# Patient Record
Sex: FEMALE | Race: BLACK OR AFRICAN AMERICAN | NOT HISPANIC OR LATINO | Employment: FULL TIME | URBAN - NONMETROPOLITAN AREA
[De-identification: names, ages, dates, MRNs, and addresses within clinical notes are randomized per-mention and may not be internally consistent; named-entity substitution may affect disease eponyms.]

---

## 2018-07-13 ENCOUNTER — HISTORICAL (OUTPATIENT)
Dept: ADMINISTRATIVE | Facility: HOSPITAL | Age: 43
End: 2018-07-13

## 2018-11-30 ENCOUNTER — HISTORICAL (OUTPATIENT)
Dept: LAB | Facility: HOSPITAL | Age: 43
End: 2018-11-30

## 2018-11-30 LAB
ABS NEUT (OLG): 2.6 X10(3)/MCL (ref 1.5–6.9)
ALBUMIN SERPL-MCNC: 3.4 GM/DL (ref 3.4–5)
ALBUMIN/GLOB SERPL: 0.8 RATIO
ALP SERPL-CCNC: 52 UNIT/L (ref 30–113)
ALT SERPL-CCNC: 14 UNIT/L (ref 10–45)
APPEARANCE, UA: CLEAR
AST SERPL-CCNC: 13 UNIT/L (ref 15–37)
BACTERIA SPEC CULT: ABNORMAL /HPF
BILIRUB SERPL-MCNC: 0.6 MG/DL (ref 0.1–0.9)
BILIRUB UR QL STRIP: NEGATIVE
BILIRUBIN DIRECT+TOT PNL SERPL-MCNC: 0.1 MG/DL (ref 0–0.3)
BILIRUBIN DIRECT+TOT PNL SERPL-MCNC: 0.5 MG/DL
BUN SERPL-MCNC: 15 MG/DL (ref 10–20)
CALCIUM SERPL-MCNC: 8.9 MG/DL (ref 8–10.5)
CHLORIDE SERPL-SCNC: 104 MMOL/L (ref 100–108)
CHOLEST SERPL-MCNC: 132 MG/DL (ref 140–200)
CHOLEST/HDLC SERPL: 2 MG/DL (ref 0–8)
CO2 SERPL-SCNC: 24 MMOL/L (ref 21–35)
COLOR UR: ABNORMAL
CREAT SERPL-MCNC: 0.67 MG/DL (ref 0.7–1.3)
ERYTHROCYTE [DISTWIDTH] IN BLOOD BY AUTOMATED COUNT: 15.5 % (ref 11.5–17)
GLOBULIN SER-MCNC: 4.4 GM/DL
GLUCOSE (UA): NEGATIVE
GLUCOSE SERPL-MCNC: 91 MG/DL (ref 75–116)
HCT VFR BLD AUTO: 32.7 % (ref 36–48)
HDLC SERPL-MCNC: 87 MG/DL (ref 35–59)
HGB BLD-MCNC: 10.1 GM/DL (ref 12–16)
HGB UR QL STRIP: ABNORMAL
KETONES UR QL STRIP: NEGATIVE
LDLC SERPL CALC-MCNC: 41 MG/DL (ref 100–129)
LEUKOCYTE ESTERASE UR QL STRIP: NEGATIVE
MCH RBC QN AUTO: 26 PG (ref 27–34)
MCHC RBC AUTO-ENTMCNC: 31 GM/DL (ref 31–36)
MCV RBC AUTO: 85 FL (ref 80–99)
MUCOUS THREADS URNS QL MICRO: ABNORMAL
NITRITE UR QL STRIP: NEGATIVE
PH UR STRIP: 6.5 [PH]
PLATELET # BLD AUTO: 265 X10(3)/MCL (ref 140–400)
PMV BLD AUTO: 11.2 FL (ref 6.8–10)
POTASSIUM SERPL-SCNC: 4.1 MMOL/L (ref 3.6–5.2)
PROT SERPL-MCNC: 7.8 GM/DL (ref 6.4–8.2)
PROT UR QL STRIP: NEGATIVE
RBC # BLD AUTO: 3.86 X10(6)/MCL (ref 4.2–5.4)
RBC #/AREA URNS HPF: ABNORMAL /HPF
SODIUM SERPL-SCNC: 137 MMOL/L (ref 135–145)
SP GR UR STRIP: 1.02
SQUAMOUS EPITHELIAL, UA: ABNORMAL /LPF
TRIGL SERPL-MCNC: 48 MG/DL (ref 35–150)
TSH SERPL-ACNC: 1.12 MIU/ML (ref 0.36–3.74)
UROBILINOGEN UR STRIP-ACNC: 0.2 EU/DL
VLDLC SERPL CALC-MCNC: 10 MG/DL
WBC # SPEC AUTO: 4 X10(3)/MCL (ref 4.5–11.5)
WBC #/AREA URNS HPF: ABNORMAL /HPF

## 2019-07-12 ENCOUNTER — HISTORICAL (OUTPATIENT)
Dept: LAB | Facility: HOSPITAL | Age: 44
End: 2019-07-12

## 2019-07-12 LAB
ABS NEUT (OLG): 1.3 X10(3)/MCL (ref 1.5–6.9)
ALBUMIN SERPL-MCNC: 3.5 GM/DL (ref 3.4–5)
ALBUMIN/GLOB SERPL: 1 RATIO
ALP SERPL-CCNC: 66 UNIT/L (ref 30–113)
ALT SERPL-CCNC: 7 UNIT/L (ref 10–45)
APPEARANCE, UA: CLEAR
AST SERPL-CCNC: 11 UNIT/L (ref 15–37)
BACTERIA SPEC CULT: ABNORMAL /HPF
BASOPHILS NFR BLD MANUAL: 0 % (ref 0–1)
BILIRUB SERPL-MCNC: 0.9 MG/DL (ref 0.1–0.9)
BILIRUB UR QL STRIP: NEGATIVE
BILIRUBIN DIRECT+TOT PNL SERPL-MCNC: 0.2 MG/DL (ref 0–0.3)
BILIRUBIN DIRECT+TOT PNL SERPL-MCNC: 0.7 MG/DL
BUN SERPL-MCNC: 16 MG/DL (ref 10–20)
CALCIUM SERPL-MCNC: 8.4 MG/DL (ref 8–10.5)
CHLORIDE SERPL-SCNC: 108 MMOL/L (ref 100–108)
CHOLEST SERPL-MCNC: 124 MG/DL (ref 140–200)
CHOLEST/HDLC SERPL: 2 MG/DL (ref 0–8)
CO2 SERPL-SCNC: 27 MMOL/L (ref 21–35)
COLOR UR: YELLOW
CREAT SERPL-MCNC: 0.79 MG/DL (ref 0.7–1.3)
DEPRECATED CALCIDIOL+CALCIFEROL SERPL-MC: 11.41 NG/ML (ref 30–80)
EOSINOPHIL NFR BLD MANUAL: 8 % (ref 0–5)
ERYTHROCYTE [DISTWIDTH] IN BLOOD BY AUTOMATED COUNT: 16 % (ref 11.5–17)
FERRITIN SERPL-MCNC: 9 NG/ML (ref 3–252)
FOLATE SERPL-MCNC: 15.5 NG/ML (ref 8.6–58.9)
GLOBULIN SER-MCNC: 3.5 GM/DL
GLUCOSE (UA): NEGATIVE
GLUCOSE SERPL-MCNC: 85 MG/DL (ref 75–116)
GRANULOCYTES NFR BLD MANUAL: 39 % (ref 47–80)
HCT VFR BLD AUTO: 28.2 % (ref 36–48)
HDLC SERPL-MCNC: 73 MG/DL (ref 35–59)
HGB BLD-MCNC: 8.8 GM/DL (ref 12–16)
HGB UR QL STRIP: ABNORMAL
IRON SATN MFR SERPL: 15 % (ref 15–55)
IRON SERPL-MCNC: 51 MCG/DL (ref 50–170)
KETONES UR QL STRIP: ABNORMAL
LDLC SERPL CALC-MCNC: 49 MG/DL (ref 100–129)
LEUKOCYTE ESTERASE UR QL STRIP: NEGATIVE
LYMPHOCYTES NFR BLD MANUAL: 45 % (ref 15–40)
MCH RBC QN AUTO: 26 PG (ref 27–34)
MCHC RBC AUTO-ENTMCNC: 31 GM/DL (ref 31–36)
MCV RBC AUTO: 85 FL (ref 80–99)
MONOCYTES NFR BLD MANUAL: 8 % (ref 4–12)
NITRITE UR QL STRIP: NEGATIVE
PH UR STRIP: 5.5 [PH]
PLATELET # BLD AUTO: 221 X10(3)/MCL (ref 140–400)
PLATELET # BLD EST: ADEQUATE 10*3/UL
PMV BLD AUTO: 10.8 FL (ref 6.8–10)
POTASSIUM SERPL-SCNC: 3.9 MMOL/L (ref 3.6–5.2)
PROT SERPL-MCNC: 7 GM/DL (ref 6.4–8.2)
PROT UR QL STRIP: NEGATIVE
RBC # BLD AUTO: 3.33 X10(6)/MCL (ref 4.2–5.4)
RBC #/AREA URNS HPF: ABNORMAL /HPF
RBC MORPH BLD: NORMAL
RET# (OHS): 0.03 X10(6)/MCL (ref 0.02–0.08)
RETICULOCYTE COUNT AUTOMATED (OLG): 0.9 % (ref 0.5–1.5)
SODIUM SERPL-SCNC: 141 MMOL/L (ref 135–145)
SP GR UR STRIP: >=1.03
SQUAMOUS EPITHELIAL, UA: ABNORMAL /LPF
T3FREE SERPL-MCNC: 2.79 PG/ML (ref 2.18–3.98)
T4 SERPL-MCNC: 6.3 MCG/DL (ref 5.3–11.5)
TESTOST SERPL-MCNC: 14 NG/DL (ref 14–76)
TIBC SERPL-MCNC: 287 MCG/DL (ref 150–375)
TIBC SERPL-MCNC: 338 MCG/DL (ref 250–450)
TRANSFERRIN SERPL-MCNC: 267 MG/DL (ref 200–360)
TRIGL SERPL-MCNC: 33 MG/DL (ref 35–150)
TSH SERPL-ACNC: 1.55 MIU/ML (ref 0.36–3.74)
UROBILINOGEN UR STRIP-ACNC: 0.2 EU/DL
VIT B12 SERPL-MCNC: 433 PG/ML (ref 193–986)
VLDLC SERPL CALC-MCNC: 7 MG/DL
WBC # SPEC AUTO: 3.2 X10(3)/MCL (ref 4.5–11.5)
WBC #/AREA URNS HPF: ABNORMAL /HPF

## 2019-07-23 ENCOUNTER — HISTORICAL (OUTPATIENT)
Dept: RADIOLOGY | Facility: HOSPITAL | Age: 44
End: 2019-07-23

## 2019-08-19 ENCOUNTER — HISTORICAL (OUTPATIENT)
Dept: INFUSION THERAPY | Facility: HOSPITAL | Age: 44
End: 2019-08-19

## 2019-08-26 ENCOUNTER — HISTORICAL (OUTPATIENT)
Dept: INFUSION THERAPY | Facility: HOSPITAL | Age: 44
End: 2019-08-26

## 2019-09-24 ENCOUNTER — HISTORICAL (OUTPATIENT)
Dept: LAB | Facility: HOSPITAL | Age: 44
End: 2019-09-24

## 2019-09-24 LAB
ABS NEUT (OLG): 1.6 X10(3)/MCL (ref 1.5–6.9)
APPEARANCE, UA: ABNORMAL
BACTERIA SPEC CULT: ABNORMAL /HPF
BILIRUB UR QL STRIP: NEGATIVE
COLOR UR: ABNORMAL
ERYTHROCYTE [DISTWIDTH] IN BLOOD BY AUTOMATED COUNT: 17.3 % (ref 11.5–17)
FERRITIN SERPL-MCNC: 370 NG/ML (ref 3–252)
FOLATE SERPL-MCNC: 16.5 NG/ML (ref 8.6–58.9)
GLUCOSE (UA): NEGATIVE
HCT VFR BLD AUTO: 37.2 % (ref 36–48)
HGB BLD-MCNC: 11.6 GM/DL (ref 12–16)
HGB UR QL STRIP: NEGATIVE
IRON SATN MFR SERPL: 26 % (ref 15–55)
IRON SERPL-MCNC: 66 MCG/DL (ref 50–170)
KETONES UR QL STRIP: NEGATIVE
LEUKOCYTE ESTERASE UR QL STRIP: ABNORMAL
MCH RBC QN AUTO: 28 PG (ref 27–34)
MCHC RBC AUTO-ENTMCNC: 31 GM/DL (ref 31–36)
MCV RBC AUTO: 90 FL (ref 80–99)
NITRITE UR QL STRIP: NEGATIVE
PH UR STRIP: 6 [PH]
PLATELET # BLD AUTO: 212 X10(3)/MCL (ref 140–400)
PMV BLD AUTO: 10.2 FL (ref 6.8–10)
PROT UR QL STRIP: NEGATIVE
RBC # BLD AUTO: 4.15 X10(6)/MCL (ref 4.2–5.4)
RBC #/AREA URNS HPF: ABNORMAL /[HPF]
RET# (OHS): 0.07 X10(6)/MCL (ref 0.02–0.08)
RETICULOCYTE COUNT AUTOMATED (OLG): 1.65 % (ref 0.5–1.5)
SP GR UR STRIP: >=1.03
SQUAMOUS EPITHELIAL, UA: ABNORMAL /LPF
TIBC SERPL-MCNC: 189 MCG/DL (ref 150–375)
TIBC SERPL-MCNC: 255 MCG/DL (ref 250–450)
TRANSFERRIN SERPL-MCNC: 199 MG/DL (ref 200–360)
UROBILINOGEN UR STRIP-ACNC: 0.2 EU/DL
VIT B12 SERPL-MCNC: 1388 PG/ML (ref 193–986)
WBC # SPEC AUTO: 3.3 X10(3)/MCL (ref 4.5–11.5)
WBC #/AREA URNS HPF: ABNORMAL /HPF

## 2019-09-25 ENCOUNTER — HISTORICAL (OUTPATIENT)
Dept: SURGERY | Facility: HOSPITAL | Age: 44
End: 2019-09-25

## 2019-09-26 LAB — FINAL CULTURE: NO GROWTH

## 2019-09-27 ENCOUNTER — HISTORICAL (OUTPATIENT)
Dept: LAB | Facility: HOSPITAL | Age: 44
End: 2019-09-27

## 2019-11-19 ENCOUNTER — HISTORICAL (OUTPATIENT)
Dept: ADMINISTRATIVE | Facility: HOSPITAL | Age: 44
End: 2019-11-19

## 2020-04-16 ENCOUNTER — HISTORICAL (OUTPATIENT)
Dept: RADIOLOGY | Facility: HOSPITAL | Age: 45
End: 2020-04-16

## 2020-04-16 LAB — POC CREATININE: 0.8 MG/DL (ref 0.6–1.3)

## 2020-05-08 ENCOUNTER — HISTORICAL (OUTPATIENT)
Dept: ADMINISTRATIVE | Facility: HOSPITAL | Age: 45
End: 2020-05-08

## 2020-07-10 ENCOUNTER — HISTORICAL (OUTPATIENT)
Dept: ADMINISTRATIVE | Facility: HOSPITAL | Age: 45
End: 2020-07-10

## 2020-07-24 ENCOUNTER — HISTORICAL (OUTPATIENT)
Dept: INFUSION THERAPY | Facility: HOSPITAL | Age: 45
End: 2020-07-24

## 2020-07-31 ENCOUNTER — HISTORICAL (OUTPATIENT)
Dept: INFUSION THERAPY | Facility: HOSPITAL | Age: 45
End: 2020-07-31

## 2020-09-04 ENCOUNTER — HISTORICAL (OUTPATIENT)
Dept: ADMINISTRATIVE | Facility: HOSPITAL | Age: 45
End: 2020-09-04

## 2020-11-18 ENCOUNTER — HISTORICAL (OUTPATIENT)
Dept: RADIOLOGY | Facility: HOSPITAL | Age: 45
End: 2020-11-18

## 2021-01-07 ENCOUNTER — HISTORICAL (OUTPATIENT)
Dept: ADMINISTRATIVE | Facility: HOSPITAL | Age: 46
End: 2021-01-07

## 2021-02-23 ENCOUNTER — HISTORICAL (OUTPATIENT)
Dept: RADIOLOGY | Facility: HOSPITAL | Age: 46
End: 2021-02-23

## 2021-05-06 ENCOUNTER — HISTORICAL (OUTPATIENT)
Dept: RADIOLOGY | Facility: HOSPITAL | Age: 46
End: 2021-05-06

## 2021-05-14 ENCOUNTER — HISTORICAL (OUTPATIENT)
Dept: RADIOLOGY | Facility: HOSPITAL | Age: 46
End: 2021-05-14

## 2021-07-06 ENCOUNTER — HISTORICAL (OUTPATIENT)
Dept: LAB | Facility: HOSPITAL | Age: 46
End: 2021-07-06

## 2021-07-06 LAB
ABS NEUT (OLG): 2 X10(3)/MCL (ref 1.5–6.9)
ALBUMIN SERPL-MCNC: 3.8 GM/DL (ref 3.5–5)
ALBUMIN/GLOB SERPL: 1.1 RATIO (ref 1.1–2)
ALP SERPL-CCNC: 84 UNIT/L (ref 40–150)
ALT SERPL-CCNC: 5 UNIT/L (ref 0–55)
APPEARANCE, UA: NORMAL
AST SERPL-CCNC: 12 UNIT/L (ref 5–34)
BACTERIA SPEC CULT: NORMAL /HPF
BASOPHILS # BLD AUTO: 0 X10(3)/MCL (ref 0–0.1)
BASOPHILS NFR BLD AUTO: 0 % (ref 0–1)
BILIRUB SERPL-MCNC: 0.8 MG/DL
BILIRUB UR QL STRIP: NEGATIVE
BILIRUBIN DIRECT+TOT PNL SERPL-MCNC: 0.3 MG/DL (ref 0–0.5)
BILIRUBIN DIRECT+TOT PNL SERPL-MCNC: 0.5 MG/DL (ref 0–0.8)
BUN SERPL-MCNC: 15 MG/DL (ref 7–18.7)
CALCIUM SERPL-MCNC: 9.5 MG/DL (ref 8.4–10.2)
CHLORIDE SERPL-SCNC: 107 MMOL/L (ref 98–107)
CHOLEST SERPL-MCNC: 126 MG/DL
CHOLEST/HDLC SERPL: 2 {RATIO} (ref 0–5)
CO2 SERPL-SCNC: 26 MMOL/L (ref 22–29)
COLOR UR: YELLOW
CREAT SERPL-MCNC: 0.75 MG/DL (ref 0.55–1.02)
DEPRECATED CALCIDIOL+CALCIFEROL SERPL-MC: 30.9 NG/ML (ref 30–80)
EOSINOPHIL # BLD AUTO: 0.1 X10(3)/MCL (ref 0–0.6)
EOSINOPHIL NFR BLD AUTO: 2 % (ref 0–5)
ERYTHROCYTE [DISTWIDTH] IN BLOOD BY AUTOMATED COUNT: 15 % (ref 11.5–17)
FERRITIN SERPL-MCNC: 39.74 NG/ML (ref 4.63–204)
FOLATE SERPL-MCNC: 13.1 NG/ML (ref 7–31.4)
GLOBULIN SER-MCNC: 3.4 GM/DL (ref 2.4–3.5)
GLUCOSE (UA): NEGATIVE MG/DL
GLUCOSE SERPL-MCNC: 90 MG/DL (ref 74–100)
HCT VFR BLD AUTO: 34.2 % (ref 36–48)
HDLC SERPL-MCNC: 53 MG/DL (ref 35–60)
HGB BLD-MCNC: 11 GM/DL (ref 12–16)
HGB UR QL STRIP: NORMAL
IMM GRANULOCYTES # BLD AUTO: 0.01 10*3/UL (ref 0–0.02)
IMM GRANULOCYTES NFR BLD AUTO: 0.3 % (ref 0–0.43)
IRON SATN MFR SERPL: 20 % (ref 20–50)
IRON SERPL-MCNC: 50 UG/DL (ref 50–170)
KETONES UR QL STRIP: NEGATIVE MG/DL
LDLC SERPL CALC-MCNC: 59 MG/DL (ref 50–140)
LEUKOCYTE ESTERASE UR QL STRIP: NEGATIVE
LYMPHOCYTES # BLD AUTO: 1.3 X10(3)/MCL (ref 0.5–4.1)
LYMPHOCYTES NFR BLD AUTO: 34 % (ref 15–40)
MCH RBC QN AUTO: 28 PG (ref 27–34)
MCHC RBC AUTO-ENTMCNC: 32 GM/DL (ref 31–36)
MCV RBC AUTO: 86 FL (ref 80–99)
MONOCYTES # BLD AUTO: 0.3 X10(3)/MCL (ref 0–1.1)
MONOCYTES NFR BLD AUTO: 9 % (ref 4–12)
NEUTROPHILS # BLD AUTO: 2 X10(3)/MCL (ref 1.5–6.9)
NEUTROPHILS NFR BLD AUTO: 55 % (ref 43–75)
NITRITE UR QL STRIP: NEGATIVE
PH UR STRIP: 5.5 [PH] (ref 4.6–8)
PLATELET # BLD AUTO: 267 X10(3)/MCL (ref 140–400)
PMV BLD AUTO: 10.5 FL (ref 6.8–10)
POTASSIUM SERPL-SCNC: 4.1 MMOL/L (ref 3.5–5.1)
PROT SERPL-MCNC: 7.2 GM/DL (ref 6.4–8.3)
PROT UR QL STRIP: NEGATIVE MG/DL
RBC # BLD AUTO: 3.97 X10(6)/MCL (ref 4.2–5.4)
RBC #/AREA URNS HPF: NORMAL /HPF
RET# (OHS): 0.07 X10(6)/MCL (ref 0.02–0.08)
RETICULOCYTE COUNT AUTOMATED (OLG): 1.69 % (ref 0.5–1.5)
SODIUM SERPL-SCNC: 138 MMOL/L (ref 136–145)
SP GR UR STRIP: >=1.03 (ref 1–1.03)
SQUAMOUS EPITHELIAL, UA: NORMAL /LPF
TIBC SERPL-MCNC: 200 UG/DL (ref 70–310)
TIBC SERPL-MCNC: 250 UG/DL (ref 250–450)
TRANSFERRIN SERPL-MCNC: 218 MG/DL (ref 180–382)
TRIGL SERPL-MCNC: 72 MG/DL (ref 37–140)
UROBILINOGEN UR STRIP-ACNC: 0.2 EU/DL
VIT B12 SERPL-MCNC: 554 PG/ML (ref 213–816)
VLDLC SERPL CALC-MCNC: 14 MG/DL
WBC # SPEC AUTO: 3.8 X10(3)/MCL (ref 4.5–11.5)
WBC #/AREA URNS HPF: NORMAL /HPF

## 2021-11-18 LAB — POC BETA-HCG (QUAL): NEGATIVE

## 2021-11-29 ENCOUNTER — HISTORICAL (OUTPATIENT)
Dept: ADMINISTRATIVE | Facility: HOSPITAL | Age: 46
End: 2021-11-29

## 2021-11-29 LAB
ERYTHROCYTE [DISTWIDTH] IN BLOOD BY AUTOMATED COUNT: 14.9 % (ref 11–14.5)
HCT VFR BLD AUTO: 32.6 % (ref 36–48)
HGB BLD-MCNC: 10.4 G/DL (ref 11.8–16)
MCH RBC QN AUTO: 26.9 PG (ref 27–34)
MCHC RBC AUTO-ENTMCNC: 31.9 G/DL (ref 31–37)
MCV RBC AUTO: 84.5 FL (ref 79–99)
PLATELET # BLD AUTO: 274 10*3/UL (ref 140–371)
PMV BLD AUTO: 11.2 FL (ref 9.4–12.4)
RBC # BLD AUTO: 3.86 10*6/UL (ref 4–5.1)
T3FREE SERPL-MCNC: 2.5 PG/ML (ref 2.7–5.2)
T4 FREE SERPL-MCNC: 0.93 NG/DL (ref 0.78–2.19)
TSH SERPL-ACNC: 1.7 UIU/ML (ref 0.36–3.74)
WBC # SPEC AUTO: 4.8 10*3/UL (ref 4–11.5)

## 2021-12-08 LAB — POC BETA-HCG (QUAL): NEGATIVE

## 2021-12-15 LAB — POC BETA-HCG (QUAL): NEGATIVE

## 2022-02-10 LAB
HUMAN PAPILLOMAVIRUS (HPV): NORMAL
PAP RECOMMENDATION EXT: NORMAL

## 2022-02-23 ENCOUNTER — HISTORICAL (OUTPATIENT)
Dept: ADMINISTRATIVE | Facility: HOSPITAL | Age: 47
End: 2022-02-23

## 2022-02-23 LAB — BCS RECOMMENDATION EXT: NORMAL

## 2022-04-11 ENCOUNTER — HISTORICAL (OUTPATIENT)
Dept: ADMINISTRATIVE | Facility: HOSPITAL | Age: 47
End: 2022-04-11
Payer: MEDICAID

## 2022-04-19 ENCOUNTER — HISTORICAL (OUTPATIENT)
Dept: LAB | Facility: HOSPITAL | Age: 47
End: 2022-04-19
Payer: MEDICAID

## 2022-04-19 LAB
ABS NEUT (OLG): 1.7 (ref 1.5–6.9)
ALBUMIN SERPL-MCNC: 3.2 G/DL (ref 3.5–5)
ALBUMIN/GLOB SERPL: 0.9 {RATIO} (ref 1.1–2)
ALP SERPL-CCNC: 60 U/L (ref 40–150)
ALT SERPL-CCNC: 6 U/L (ref 0–55)
APPEARANCE, UA: CLEAR
AST SERPL-CCNC: 12 U/L (ref 5–34)
BACTERIA SPEC CULT: NORMAL
BASOPHILS NFR BLD MANUAL: 0 % (ref 0–1)
BILIRUB SERPL-MCNC: 0.7 MG/DL
BILIRUB UR QL STRIP: NEGATIVE
BILIRUBIN DIRECT+TOT PNL SERPL-MCNC: 0.3 (ref 0–0.5)
BILIRUBIN DIRECT+TOT PNL SERPL-MCNC: 0.4 (ref 0–0.8)
BUN SERPL-MCNC: 14 MG/DL (ref 7–18.7)
CALCIUM SERPL-MCNC: 8.9 MG/DL (ref 8.7–10.5)
CHLORIDE SERPL-SCNC: 110 MMOL/L (ref 98–107)
CHOLEST SERPL-MCNC: 143 MG/DL
CHOLEST/HDLC SERPL: 2 {RATIO} (ref 0–5)
CO2 SERPL-SCNC: 22 MMOL/L (ref 22–29)
COLOR UR: YELLOW
CREAT SERPL-MCNC: 0.82 MG/DL (ref 0.55–1.02)
DEPRECATED CALCIDIOL+CALCIFEROL SERPL-MC: 33.4 NG/ML (ref 30–80)
DO MICRO?: YES
EOSINOPHIL NFR BLD MANUAL: 5 % (ref 0–5)
ERYTHROCYTE [DISTWIDTH] IN BLOOD BY AUTOMATED COUNT: 14.5 % (ref 11.5–17)
ESTRADIOL SERPL HS-MCNC: <24 PG/ML
FERRITIN SERPL-MCNC: 10.7 NG/ML (ref 4.63–204)
FOLATE SERPL-MCNC: 10.5 NG/ML (ref 7–31.4)
FSH SERPL-ACNC: 0.28 M[IU]/ML
GLOBULIN SER-MCNC: 3.4 G/DL (ref 2.4–3.5)
GLUCOSE (UA): NEGATIVE
GLUCOSE SERPL-MCNC: 92 MG/DL (ref 74–100)
GRANULOCYTES NFR BLD MANUAL: 47 % (ref 47–80)
HCT VFR BLD AUTO: 27.7 % (ref 36–48)
HDLC SERPL-MCNC: 66 MG/DL (ref 35–60)
HEMOLYSIS INTERF INDEX SERPL-ACNC: <0
HGB BLD-MCNC: 8.4 G/DL (ref 12–16)
HGB UR QL STRIP: NORMAL
HYPOCHROMIA BLD QL SMEAR: NORMAL
ICTERIC INTERF INDEX SERPL-ACNC: 1
KETONES UR QL STRIP: NEGATIVE
LDLC SERPL CALC-MCNC: 53 MG/DL (ref 50–140)
LEUKOCYTE ESTERASE UR QL STRIP: NEGATIVE
LIPEMIC INTERF INDEX SERPL-ACNC: 0
LYMPHOCYTES NFR BLD MANUAL: 43 % (ref 15–40)
MANUAL DIFF? (OHS): YES
MCH RBC QN AUTO: 26 PG (ref 27–34)
MCHC RBC AUTO-ENTMCNC: 30 G/DL (ref 31–36)
MCV RBC AUTO: 85 FL (ref 80–99)
MONOCYTES NFR BLD MANUAL: 5 % (ref 4–12)
MUCOUS THREADS URNS QL MICRO: NORMAL
NITRITE UR QL STRIP: NEGATIVE
PH UR STRIP: 5.5 [PH] (ref 4.6–8)
PLATELET # BLD AUTO: 294 10*3/UL (ref 140–400)
PLATELET # BLD EST: ADEQUATE 10*3/UL
PMV BLD AUTO: 10.9 FL (ref 6.8–10)
POTASSIUM SERPL-SCNC: 3.7 MMOL/L (ref 3.5–5.1)
PROT SERPL-MCNC: 6.6 G/DL (ref 6.4–8.3)
PROT UR QL STRIP: 30
RBC # BLD AUTO: 3.26 10*6/UL (ref 4.2–5.4)
RBC #/AREA URNS HPF: NORMAL /[HPF] (ref 0–2)
RBC MORPH BLD: NORMAL
RET# (OHS): 0.05 (ref 0.02–0.08)
RETICULOCYTE COUNT AUTOMATED (OLG): 1.58 (ref 0.5–1.5)
SODIUM SERPL-SCNC: 139 MMOL/L (ref 136–145)
SP GR UR STRIP: >=1.03 (ref 1–1.03)
SQUAMOUS EPITHELIAL, UA: NORMAL
T3FREE SERPL-MCNC: 2.93 PG/ML (ref 1.58–3.91)
T4 SERPL-MCNC: 10.64 UG/DL (ref 4.87–11.72)
TESTOST SERPL-MCNC: 29.64 NG/DL (ref 13.84–53.35)
TRANSFERRIN SERPL-MCNC: 318 MG/DL (ref 180–382)
TRIGL SERPL-MCNC: 118 MG/DL (ref 37–140)
TSH SERPL-ACNC: 1.96 M[IU]/L (ref 0.35–4.94)
URATE SERPL-MCNC: 3.9 MG/DL (ref 2.6–6)
UROBILINOGEN UR STRIP-ACNC: 0.2
VIT B12 SERPL-MCNC: 326 PG/ML (ref 213–816)
VLDLC SERPL CALC-MCNC: 24 MG/DL
WBC # SPEC AUTO: 3.4 10*3/UL (ref 4.5–11.5)
WBC #/AREA URNS HPF: NORMAL /[HPF] (ref 0–2)

## 2022-04-28 VITALS
SYSTOLIC BLOOD PRESSURE: 128 MMHG | BODY MASS INDEX: 34.04 KG/M2 | HEIGHT: 68 IN | WEIGHT: 224.63 LBS | DIASTOLIC BLOOD PRESSURE: 82 MMHG

## 2022-05-07 ENCOUNTER — HISTORICAL (OUTPATIENT)
Dept: ADMINISTRATIVE | Facility: HOSPITAL | Age: 47
End: 2022-05-07
Payer: MEDICAID

## 2022-05-09 DIAGNOSIS — D50.9 IRON DEFICIENCY ANEMIA, UNSPECIFIED IRON DEFICIENCY ANEMIA TYPE: ICD-10-CM

## 2022-05-09 RX ORDER — HEPARIN 100 UNIT/ML
5 SYRINGE INTRAVENOUS
Status: CANCELLED | OUTPATIENT
Start: 2022-05-10

## 2022-05-09 RX ORDER — METHYLPREDNISOLONE SOD SUCC 125 MG
40 VIAL (EA) INJECTION
Status: CANCELLED
Start: 2022-05-10

## 2022-05-09 RX ORDER — ACETAMINOPHEN 325 MG/1
650 TABLET ORAL
Status: CANCELLED
Start: 2022-05-10

## 2022-05-09 RX ORDER — SODIUM CHLORIDE 0.9 % (FLUSH) 0.9 %
10 SYRINGE (ML) INJECTION
Status: CANCELLED | OUTPATIENT
Start: 2022-05-10

## 2022-05-09 RX ORDER — DIPHENHYDRAMINE HCL 25 MG
25 CAPSULE ORAL
Status: CANCELLED
Start: 2022-05-10

## 2022-05-10 ENCOUNTER — INFUSION (OUTPATIENT)
Dept: INFUSION THERAPY | Facility: HOSPITAL | Age: 47
End: 2022-05-10
Attending: NURSE PRACTITIONER
Payer: MEDICAID

## 2022-05-10 VITALS
HEART RATE: 86 BPM | BODY MASS INDEX: 31.52 KG/M2 | WEIGHT: 208 LBS | DIASTOLIC BLOOD PRESSURE: 78 MMHG | SYSTOLIC BLOOD PRESSURE: 147 MMHG | HEIGHT: 68 IN | OXYGEN SATURATION: 99 % | RESPIRATION RATE: 18 BRPM | TEMPERATURE: 99 F

## 2022-05-10 DIAGNOSIS — D50.9 IRON DEFICIENCY ANEMIA, UNSPECIFIED IRON DEFICIENCY ANEMIA TYPE: Primary | ICD-10-CM

## 2022-05-10 PROCEDURE — 96365 THER/PROPH/DIAG IV INF INIT: CPT

## 2022-05-10 PROCEDURE — 25000003 PHARM REV CODE 250

## 2022-05-10 PROCEDURE — 63600175 PHARM REV CODE 636 W HCPCS

## 2022-05-10 RX ORDER — METHYLPREDNISOLONE SOD SUCC 125 MG
40 VIAL (EA) INJECTION
Status: CANCELLED
Start: 2022-05-17

## 2022-05-10 RX ORDER — ACETAMINOPHEN 325 MG/1
650 TABLET ORAL
Status: CANCELLED
Start: 2022-05-17

## 2022-05-10 RX ORDER — SODIUM CHLORIDE 0.9 % (FLUSH) 0.9 %
10 SYRINGE (ML) INJECTION
Status: CANCELLED | OUTPATIENT
Start: 2022-05-17

## 2022-05-10 RX ORDER — HEPARIN 100 UNIT/ML
5 SYRINGE INTRAVENOUS
Status: CANCELLED | OUTPATIENT
Start: 2022-05-17

## 2022-05-10 RX ORDER — ACETAMINOPHEN 325 MG/1
650 TABLET ORAL
Status: COMPLETED | OUTPATIENT
Start: 2022-05-10 | End: 2022-05-10

## 2022-05-10 RX ORDER — DIPHENHYDRAMINE HCL 25 MG
25 CAPSULE ORAL
Status: COMPLETED | OUTPATIENT
Start: 2022-05-10 | End: 2022-05-10

## 2022-05-10 RX ORDER — DIPHENHYDRAMINE HCL 25 MG
25 CAPSULE ORAL
Status: CANCELLED
Start: 2022-05-17

## 2022-05-10 RX ORDER — METHYLPREDNISOLONE SOD SUCC 125 MG
40 VIAL (EA) INJECTION
Status: COMPLETED | OUTPATIENT
Start: 2022-05-10 | End: 2022-05-10

## 2022-05-10 RX ADMIN — ACETAMINOPHEN 650 MG: 325 TABLET ORAL at 01:05

## 2022-05-10 RX ADMIN — DIPHENHYDRAMINE HYDROCHLORIDE 25 MG: 25 CAPSULE ORAL at 01:05

## 2022-05-10 RX ADMIN — METHYLPREDNISOLONE SODIUM SUCCINATE 40 MG: 125 INJECTION, POWDER, FOR SOLUTION INTRAMUSCULAR; INTRAVENOUS at 01:05

## 2022-05-10 RX ADMIN — FERRIC CARBOXYMALTOSE INJECTION 750 MG: 50 INJECTION, SOLUTION INTRAVENOUS at 02:05

## 2022-05-19 ENCOUNTER — INFUSION (OUTPATIENT)
Dept: INFUSION THERAPY | Facility: HOSPITAL | Age: 47
End: 2022-05-19
Attending: NURSE PRACTITIONER
Payer: MEDICAID

## 2022-05-19 VITALS
HEART RATE: 84 BPM | HEIGHT: 68 IN | DIASTOLIC BLOOD PRESSURE: 77 MMHG | BODY MASS INDEX: 30.89 KG/M2 | TEMPERATURE: 98 F | SYSTOLIC BLOOD PRESSURE: 149 MMHG | OXYGEN SATURATION: 100 % | RESPIRATION RATE: 18 BRPM | WEIGHT: 203.81 LBS

## 2022-05-19 DIAGNOSIS — D50.9 IRON DEFICIENCY ANEMIA, UNSPECIFIED IRON DEFICIENCY ANEMIA TYPE: Primary | ICD-10-CM

## 2022-05-19 PROCEDURE — 63600175 PHARM REV CODE 636 W HCPCS: Mod: JG

## 2022-05-19 PROCEDURE — 96365 THER/PROPH/DIAG IV INF INIT: CPT

## 2022-05-19 PROCEDURE — 25000003 PHARM REV CODE 250

## 2022-05-19 PROCEDURE — 96375 TX/PRO/DX INJ NEW DRUG ADDON: CPT

## 2022-05-19 RX ORDER — DIPHENHYDRAMINE HCL 25 MG
25 CAPSULE ORAL
Status: CANCELLED
Start: 2022-05-24

## 2022-05-19 RX ORDER — METHYLPREDNISOLONE SOD SUCC 125 MG
40 VIAL (EA) INJECTION
Status: COMPLETED | OUTPATIENT
Start: 2022-05-19 | End: 2022-05-19

## 2022-05-19 RX ORDER — ACETAMINOPHEN 325 MG/1
650 TABLET ORAL
Status: COMPLETED | OUTPATIENT
Start: 2022-05-19 | End: 2022-05-19

## 2022-05-19 RX ORDER — METHYLPREDNISOLONE SOD SUCC 125 MG
40 VIAL (EA) INJECTION
Status: CANCELLED
Start: 2022-05-24

## 2022-05-19 RX ORDER — ACETAMINOPHEN 325 MG/1
650 TABLET ORAL
Status: CANCELLED
Start: 2022-05-24

## 2022-05-19 RX ORDER — SODIUM CHLORIDE 0.9 % (FLUSH) 0.9 %
10 SYRINGE (ML) INJECTION
Status: CANCELLED | OUTPATIENT
Start: 2022-05-24

## 2022-05-19 RX ORDER — HEPARIN 100 UNIT/ML
5 SYRINGE INTRAVENOUS
Status: CANCELLED | OUTPATIENT
Start: 2022-05-24

## 2022-05-19 RX ORDER — DIPHENHYDRAMINE HCL 25 MG
25 CAPSULE ORAL
Status: COMPLETED | OUTPATIENT
Start: 2022-05-19 | End: 2022-05-19

## 2022-05-19 RX ADMIN — FERRIC CARBOXYMALTOSE INJECTION 750 MG: 50 INJECTION, SOLUTION INTRAVENOUS at 02:05

## 2022-05-19 RX ADMIN — DIPHENHYDRAMINE HYDROCHLORIDE 25 MG: 25 CAPSULE ORAL at 02:05

## 2022-05-19 RX ADMIN — METHYLPREDNISOLONE SODIUM SUCCINATE 40 MG: 125 INJECTION, POWDER, FOR SOLUTION INTRAMUSCULAR; INTRAVENOUS at 02:05

## 2022-05-19 RX ADMIN — ACETAMINOPHEN 650 MG: 325 TABLET ORAL at 02:05

## 2022-05-23 DIAGNOSIS — D36.10 BENIGN NEOPLASM OF NERVE SHEATH: Primary | ICD-10-CM

## 2022-05-24 DIAGNOSIS — D36.10 BENIGN NEOPLASM OF NERVE SHEATH: Primary | ICD-10-CM

## 2022-05-24 DIAGNOSIS — M62.58 MUSCLE WASTING AND ATROPHY, NOT ELSEWHERE CLASSIFIED, OTHER SITE: ICD-10-CM

## 2022-06-03 ENCOUNTER — APPOINTMENT (OUTPATIENT)
Dept: RADIOLOGY | Facility: HOSPITAL | Age: 47
End: 2022-06-03
Attending: NEUROLOGICAL SURGERY
Payer: COMMERCIAL

## 2022-06-03 DIAGNOSIS — M62.58 MUSCLE WASTING AND ATROPHY, NOT ELSEWHERE CLASSIFIED, OTHER SITE: ICD-10-CM

## 2022-06-03 DIAGNOSIS — D36.10 BENIGN NEOPLASM OF NERVE SHEATH: ICD-10-CM

## 2022-06-03 PROCEDURE — 25500020 PHARM REV CODE 255: Performed by: NEUROLOGICAL SURGERY

## 2022-06-03 PROCEDURE — 72156 MRI NECK SPINE W/O & W/DYE: CPT | Mod: TC

## 2022-06-03 PROCEDURE — A9577 INJ MULTIHANCE: HCPCS | Performed by: NEUROLOGICAL SURGERY

## 2022-06-03 RX ADMIN — GADOBENATE DIMEGLUMINE 20 ML: 529 INJECTION, SOLUTION INTRAVENOUS at 02:06

## 2022-06-06 ENCOUNTER — OFFICE VISIT (OUTPATIENT)
Dept: NEUROSURGERY | Facility: CLINIC | Age: 47
End: 2022-06-06
Payer: COMMERCIAL

## 2022-06-06 VITALS
WEIGHT: 204 LBS | HEIGHT: 67 IN | HEART RATE: 80 BPM | RESPIRATION RATE: 16 BRPM | DIASTOLIC BLOOD PRESSURE: 88 MMHG | BODY MASS INDEX: 32.02 KG/M2 | SYSTOLIC BLOOD PRESSURE: 130 MMHG

## 2022-06-06 DIAGNOSIS — D36.10 BENIGN NEOPLASM OF NERVE SHEATH: Primary | ICD-10-CM

## 2022-06-06 PROCEDURE — 1160F RVW MEDS BY RX/DR IN RCRD: CPT | Mod: CPTII,,, | Performed by: NEUROLOGICAL SURGERY

## 2022-06-06 PROCEDURE — 3079F DIAST BP 80-89 MM HG: CPT | Mod: CPTII,,, | Performed by: NEUROLOGICAL SURGERY

## 2022-06-06 PROCEDURE — 3075F PR MOST RECENT SYSTOLIC BLOOD PRESS GE 130-139MM HG: ICD-10-PCS | Mod: CPTII,,, | Performed by: NEUROLOGICAL SURGERY

## 2022-06-06 PROCEDURE — 3008F PR BODY MASS INDEX (BMI) DOCUMENTED: ICD-10-PCS | Mod: CPTII,,, | Performed by: NEUROLOGICAL SURGERY

## 2022-06-06 PROCEDURE — 1160F PR REVIEW ALL MEDS BY PRESCRIBER/CLIN PHARMACIST DOCUMENTED: ICD-10-PCS | Mod: CPTII,,, | Performed by: NEUROLOGICAL SURGERY

## 2022-06-06 PROCEDURE — 1159F PR MEDICATION LIST DOCUMENTED IN MEDICAL RECORD: ICD-10-PCS | Mod: CPTII,,, | Performed by: NEUROLOGICAL SURGERY

## 2022-06-06 PROCEDURE — 99213 PR OFFICE/OUTPT VISIT, EST, LEVL III, 20-29 MIN: ICD-10-PCS | Mod: ,,, | Performed by: NEUROLOGICAL SURGERY

## 2022-06-06 PROCEDURE — 99213 OFFICE O/P EST LOW 20 MIN: CPT | Mod: ,,, | Performed by: NEUROLOGICAL SURGERY

## 2022-06-06 PROCEDURE — 3079F PR MOST RECENT DIASTOLIC BLOOD PRESSURE 80-89 MM HG: ICD-10-PCS | Mod: CPTII,,, | Performed by: NEUROLOGICAL SURGERY

## 2022-06-06 PROCEDURE — 1159F MED LIST DOCD IN RCRD: CPT | Mod: CPTII,,, | Performed by: NEUROLOGICAL SURGERY

## 2022-06-06 PROCEDURE — 3075F SYST BP GE 130 - 139MM HG: CPT | Mod: CPTII,,, | Performed by: NEUROLOGICAL SURGERY

## 2022-06-06 PROCEDURE — 3008F BODY MASS INDEX DOCD: CPT | Mod: CPTII,,, | Performed by: NEUROLOGICAL SURGERY

## 2022-06-06 RX ORDER — CLINDAMYCIN HYDROCHLORIDE 300 MG/1
CAPSULE ORAL
COMMUNITY
Start: 2022-05-18 | End: 2023-05-11

## 2022-06-06 RX ORDER — AMOXICILLIN AND CLAVULANATE POTASSIUM 875; 125 MG/1; MG/1
1 TABLET, FILM COATED ORAL 2 TIMES DAILY
COMMUNITY
Start: 2022-04-18 | End: 2023-05-11

## 2022-06-06 RX ORDER — FLUTICASONE PROPIONATE 50 MCG
SPRAY, SUSPENSION (ML) NASAL
COMMUNITY
Start: 2022-03-28 | End: 2023-06-30

## 2022-06-06 RX ORDER — IBUPROFEN 800 MG/1
TABLET ORAL
COMMUNITY
Start: 2022-05-02 | End: 2023-05-11

## 2022-06-06 RX ORDER — CYANOCOBALAMIN 1000 UG/ML
INJECTION, SOLUTION INTRAMUSCULAR; SUBCUTANEOUS
COMMUNITY
Start: 2022-05-02 | End: 2023-12-07

## 2022-06-06 RX ORDER — ESZOPICLONE 3 MG/1
3 TABLET, FILM COATED ORAL NIGHTLY
COMMUNITY
Start: 2022-05-28 | End: 2023-06-30

## 2022-06-06 RX ORDER — VITAMIN E 268 MG
400 CAPSULE ORAL DAILY
COMMUNITY

## 2022-06-06 RX ORDER — UBIDECARENONE 30 MG
30 CAPSULE ORAL 3 TIMES DAILY
COMMUNITY

## 2022-06-06 RX ORDER — PREGABALIN 150 MG/1
150 CAPSULE ORAL 2 TIMES DAILY
COMMUNITY
Start: 2022-05-10 | End: 2023-06-30

## 2022-06-06 RX ORDER — KETOROLAC TROMETHAMINE 10 MG/1
10 TABLET, FILM COATED ORAL EVERY 6 HOURS PRN
COMMUNITY
Start: 2022-04-18 | End: 2023-05-11 | Stop reason: SDUPTHER

## 2022-06-06 RX ORDER — IBUPROFEN 100 MG/5ML
1000 SUSPENSION, ORAL (FINAL DOSE FORM) ORAL DAILY
COMMUNITY
End: 2023-05-11 | Stop reason: SDUPTHER

## 2022-06-06 RX ORDER — HYDROCODONE BITARTRATE AND ACETAMINOPHEN 7.5; 325 MG/1; MG/1
1 TABLET ORAL 2 TIMES DAILY PRN
COMMUNITY
Start: 2022-06-01 | End: 2023-05-11

## 2022-06-06 RX ORDER — CETIRIZINE HYDROCHLORIDE 10 MG/1
10 TABLET ORAL DAILY
COMMUNITY
Start: 2022-03-28 | End: 2023-06-30

## 2022-06-06 RX ORDER — ACETAMINOPHEN 500 MG
5000 TABLET ORAL DAILY
COMMUNITY
End: 2023-05-11 | Stop reason: SDUPTHER

## 2022-06-06 RX ORDER — DESOGESTREL AND ETHINYL ESTRADIOL 0.15-0.03
1 KIT ORAL DAILY
COMMUNITY
Start: 2022-05-28 | End: 2023-02-09

## 2022-06-06 NOTE — PROGRESS NOTES
[facility]  Neurosurgery  Established Patient    SUBJECTIVE:     History of Present Illness:  Patient is a 46 y.o. female presents for a 2 year post op appointment with a cervical MRI.  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  She continues with pain and tightness across the neck and upper trapezius region, but says this has improved since her last office visit.  She is getting trigger point injections and Botox injections with Dr. Pedro Culp, which have helped significantly.  She says the trigger point injections last about 5 weeks.  She had her first Botox injections on May 3.  Dr. Culp anticipates the Botox lasting about three months.  She says she is feeling much better overall and has lost a little over 20lbs since her last visit a year ago.    Patient Active Problem List    Diagnosis Date Noted    Iron deficiency anemia, unspecified 05/09/2022     Past Medical History:   Diagnosis Date    Anemia     Benign neoplasm of nerve sheath     Cervical pain     Lumbar radiculopathy     Muscle spasticity     Muscle wasting and atrophy, not elsewhere classified, other site     Thoracic back pain       Past Surgical History:   Procedure Laterality Date    C6-T1 LAMINECTOMIES W/ EXCISION OF RIGHT C8 SCHWANNOMA  05/01/2020    Dr. Orlando      (Not in a hospital admission)    Review of patient's allergies indicates:  No Known Allergies   Social History     Tobacco Use    Smoking status: Never Smoker    Smokeless tobacco: Never Used   Substance Use Topics    Alcohol use: Yes      Family History   Problem Relation Age of Onset    Heart disease Mother     Hyperlipidemia Mother     Kidney disease Mother     Vascular Malformations Mother     Diabetes Mother     Heart disease Father     Hyperlipidemia Father     Stroke Father        Review of Systems:    Pertinent items are noted in  "HPI.      OBJECTIVE:     Vital Signs  Pulse: 80  Resp: 16  BP: 130/88  Pain Score:   2  Height: 5' 7" (170.2 cm)  Weight: 92.5 kg (204 lb)  Body mass index is 31.95 kg/m².    General:  alert, no distress, cooperative    Head:  Normocephalic, without obvious abnormality, atraumatic    Lungs:   Breathing is quiet, non-lablored    Neurological:    Oriented to Person, Place, Time   Speech:  normal  Memory, cognition, and affect are appropriate.  Extraocular movements are intact.  Movements of facial expression are intact and symmetric.  no muscle wasting or atrophy and normal resting muscle tone  Sensation is intact.  Gait is normal        ASSESSMENT/PLAN:     1. Benign neoplasm of nerve sheath    Her most recent MRI shows no evidence of residual/recurrent tumor.  Overall, she is doing much better and in good spirits.  We will see her again in 1 year with another MRI.    Elsie Medrano RN acting as scribe for this encounter on 06/06/2022      I, Dr. Derick Orlando, personally performed the services described in this documentation. All medical record entries made by the scribe were at my direction and in my presence.  I have reviewed the chart and agree that the record reflects my personal performance and is accurate and complete. Derick Orlando MD.  2:49 PM 06/06/2022  "

## 2022-06-14 ENCOUNTER — LAB VISIT (OUTPATIENT)
Dept: LAB | Facility: HOSPITAL | Age: 47
End: 2022-06-14
Attending: NURSE PRACTITIONER
Payer: MEDICAID

## 2022-06-14 DIAGNOSIS — D72.818 BASOPHILOPENIA: ICD-10-CM

## 2022-06-14 DIAGNOSIS — F51.02 TRANSIENT DISORDER OF INITIATING OR MAINTAINING SLEEP: ICD-10-CM

## 2022-06-14 DIAGNOSIS — R53.83 FATIGUE, UNSPECIFIED TYPE: ICD-10-CM

## 2022-06-14 DIAGNOSIS — D50.9 IRON DEFICIENCY ANEMIA, UNSPECIFIED: ICD-10-CM

## 2022-06-14 DIAGNOSIS — N92.1 METRORRHAGIA: ICD-10-CM

## 2022-06-14 DIAGNOSIS — D64.9 ANEMIA, UNSPECIFIED TYPE: Primary | ICD-10-CM

## 2022-06-14 LAB
(HCYS)2 SERPL-MCNC: 8.5 UMOL/L (ref 5.1–15.4)
APTT PPP: 28.2 SECONDS (ref 23.2–33.7)
CRP SERPL-MCNC: 2.3 MG/L
ERYTHROCYTE [SEDIMENTATION RATE] IN BLOOD: 20 MM/HR (ref 0–20)
HCT VFR BLD AUTO: 33 % (ref 37–47)
HGB BLD-MCNC: 10 GM/DL (ref 12–16)
INR BLD: 1.02 (ref 0–1.3)
PROTHROMBIN TIME: 13.3 SECONDS (ref 12.5–14.5)

## 2022-06-14 PROCEDURE — 85730 THROMBOPLASTIN TIME PARTIAL: CPT

## 2022-06-14 PROCEDURE — 81241 F5 GENE: CPT

## 2022-06-14 PROCEDURE — 85306 CLOT INHIBIT PROT S FREE: CPT

## 2022-06-14 PROCEDURE — 86140 C-REACTIVE PROTEIN: CPT

## 2022-06-14 PROCEDURE — 85651 RBC SED RATE NONAUTOMATED: CPT

## 2022-06-14 PROCEDURE — 85610 PROTHROMBIN TIME: CPT

## 2022-06-14 PROCEDURE — 85014 HEMATOCRIT: CPT

## 2022-06-14 PROCEDURE — 83068 HEMOGLOBIN UNSTABLE SCREEN: CPT

## 2022-06-14 PROCEDURE — 85303 CLOT INHIBIT PROT C ACTIVITY: CPT

## 2022-06-14 PROCEDURE — 86147 CARDIOLIPIN ANTIBODY EA IG: CPT

## 2022-06-14 PROCEDURE — 36415 COLL VENOUS BLD VENIPUNCTURE: CPT

## 2022-06-14 PROCEDURE — 85302 CLOT INHIBIT PROT C ANTIGEN: CPT

## 2022-06-14 PROCEDURE — 83020 HEMOGLOBIN ELECTROPHORESIS: CPT

## 2022-06-14 PROCEDURE — 82664 ELECTROPHORETIC TEST: CPT

## 2022-06-14 PROCEDURE — 86039 ANTINUCLEAR ANTIBODIES (ANA): CPT

## 2022-06-14 PROCEDURE — 83090 ASSAY OF HOMOCYSTEINE: CPT

## 2022-06-15 LAB
ANA SER QL HEP2 SUBST: NORMAL
CARDIOLIPIN ANTIBODY IGA (OHS): NEGATIVE APL UNIT/ML
CARDIOLIPIN ANTIBODY IGG (OHS): NEGATIVE GPL UNIT/ML
CARDIOLIPIN ANTIBODY IGM (OHS): NEGATIVE MPL UNIT/ML

## 2022-06-16 LAB
APTT PPP: NORMAL SEC (ref 25–37)
HGB A MFR BLD ELPH: 97.7 % (ref 95.8–98)
HGB A2 MFR BLD: 2.3 % (ref 2–3.3)
HGB F MFR BLD: 0 % (ref 0–0.9)
HGB FRACT BLD ELPH-IMP: NORMAL
INR PPP: NORMAL (ref 0.9–1.1)
LA 3 SCREEN W REFLEX-IMP: NORMAL
M HPLC HB VARIANT, B: NORMAL
PROT C ACT/NOR PPP CHRO: NORMAL % (ref 70–150)
PROT S ACT/NOR PPP: NORMAL %
PROTHROMBIN TIME: NORMAL SEC (ref 9.4–12.5)
SCREEN DRVVT/NORMAL: NORMAL %

## 2022-06-17 LAB
COAGULATION SPECIALIST REVIEW: NORMAL
F5 P.R506Q BLD/T QL: NEGATIVE
PROVIDER SIGNING NAME: NORMAL

## 2022-06-20 LAB
APTT PPP: 29 SEC (ref 25–37)
INR PPP: 1 (ref 0.9–1.1)
LA 3 SCREEN W REFLEX-IMP: NORMAL
PROT S FREE AG ACT/NOR PPP IA: 56 % (ref 50–160)
PROTHROMBIN TIME: 10.5 SEC (ref 9.4–12.5)
SCREEN DRVVT/NORMAL: 0.98 RATIO

## 2022-06-22 LAB — Lab: >95 % (ref 63–153)

## 2022-09-21 ENCOUNTER — HISTORICAL (OUTPATIENT)
Dept: ADMINISTRATIVE | Facility: HOSPITAL | Age: 47
End: 2022-09-21
Payer: MEDICAID

## 2022-12-27 ENCOUNTER — DOCUMENTATION ONLY (OUTPATIENT)
Dept: ADMINISTRATIVE | Facility: HOSPITAL | Age: 47
End: 2022-12-27
Payer: COMMERCIAL

## 2023-01-12 ENCOUNTER — DOCUMENTATION ONLY (OUTPATIENT)
Dept: ADMINISTRATIVE | Facility: HOSPITAL | Age: 48
End: 2023-01-12
Payer: COMMERCIAL

## 2023-03-07 DIAGNOSIS — D36.10 BENIGN NERVE SHEATH NEOPLASM: Primary | ICD-10-CM

## 2023-05-11 ENCOUNTER — OFFICE VISIT (OUTPATIENT)
Dept: OBSTETRICS AND GYNECOLOGY | Facility: CLINIC | Age: 48
End: 2023-05-11
Payer: COMMERCIAL

## 2023-05-11 VITALS
WEIGHT: 197.56 LBS | DIASTOLIC BLOOD PRESSURE: 74 MMHG | HEIGHT: 68 IN | TEMPERATURE: 98 F | BODY MASS INDEX: 29.94 KG/M2 | SYSTOLIC BLOOD PRESSURE: 122 MMHG

## 2023-05-11 DIAGNOSIS — D25.9 UTERINE LEIOMYOMA, UNSPECIFIED LOCATION: ICD-10-CM

## 2023-05-11 DIAGNOSIS — N92.1 MENOMETRORRHAGIA: ICD-10-CM

## 2023-05-11 DIAGNOSIS — Z01.419 WELL WOMAN EXAM: ICD-10-CM

## 2023-05-11 DIAGNOSIS — Z12.4 SCREENING FOR MALIGNANT NEOPLASM OF THE CERVIX: Primary | ICD-10-CM

## 2023-05-11 DIAGNOSIS — Z12.31 ENCOUNTER FOR SCREENING MAMMOGRAM FOR MALIGNANT NEOPLASM OF BREAST: ICD-10-CM

## 2023-05-11 PROCEDURE — 3008F PR BODY MASS INDEX (BMI) DOCUMENTED: ICD-10-PCS | Mod: CPTII,,, | Performed by: OBSTETRICS & GYNECOLOGY

## 2023-05-11 PROCEDURE — 3078F PR MOST RECENT DIASTOLIC BLOOD PRESSURE < 80 MM HG: ICD-10-PCS | Mod: CPTII,,, | Performed by: OBSTETRICS & GYNECOLOGY

## 2023-05-11 PROCEDURE — 3074F PR MOST RECENT SYSTOLIC BLOOD PRESSURE < 130 MM HG: ICD-10-PCS | Mod: CPTII,,, | Performed by: OBSTETRICS & GYNECOLOGY

## 2023-05-11 PROCEDURE — 1160F PR REVIEW ALL MEDS BY PRESCRIBER/CLIN PHARMACIST DOCUMENTED: ICD-10-PCS | Mod: CPTII,,, | Performed by: OBSTETRICS & GYNECOLOGY

## 2023-05-11 PROCEDURE — 1159F PR MEDICATION LIST DOCUMENTED IN MEDICAL RECORD: ICD-10-PCS | Mod: CPTII,,, | Performed by: OBSTETRICS & GYNECOLOGY

## 2023-05-11 PROCEDURE — 3008F BODY MASS INDEX DOCD: CPT | Mod: CPTII,,, | Performed by: OBSTETRICS & GYNECOLOGY

## 2023-05-11 PROCEDURE — 3078F DIAST BP <80 MM HG: CPT | Mod: CPTII,,, | Performed by: OBSTETRICS & GYNECOLOGY

## 2023-05-11 PROCEDURE — 1159F MED LIST DOCD IN RCRD: CPT | Mod: CPTII,,, | Performed by: OBSTETRICS & GYNECOLOGY

## 2023-05-11 PROCEDURE — 3074F SYST BP LT 130 MM HG: CPT | Mod: CPTII,,, | Performed by: OBSTETRICS & GYNECOLOGY

## 2023-05-11 PROCEDURE — 99396 PR PREVENTIVE VISIT,EST,40-64: ICD-10-PCS | Mod: ,,, | Performed by: OBSTETRICS & GYNECOLOGY

## 2023-05-11 PROCEDURE — 99396 PREV VISIT EST AGE 40-64: CPT | Mod: ,,, | Performed by: OBSTETRICS & GYNECOLOGY

## 2023-05-11 PROCEDURE — 1160F RVW MEDS BY RX/DR IN RCRD: CPT | Mod: CPTII,,, | Performed by: OBSTETRICS & GYNECOLOGY

## 2023-05-11 RX ORDER — IBUPROFEN 800 MG/1
TABLET ORAL
Status: ON HOLD | COMMUNITY
Start: 2022-06-07 | End: 2024-02-07 | Stop reason: HOSPADM

## 2023-05-11 RX ORDER — IBUPROFEN 100 MG/5ML
1 SUSPENSION, ORAL (FINAL DOSE FORM) ORAL DAILY
COMMUNITY

## 2023-05-11 RX ORDER — CHOLECALCIFEROL (VITAMIN D3) 125 MCG
1 CAPSULE ORAL DAILY
COMMUNITY

## 2023-05-11 NOTE — PROGRESS NOTES
Chief Complaint:   Well Woman (Annual Gyn Exam.  LMP: 23. Taking Desogen~Needs Refills.  +Uterine Fibroids.  /)     History of Present Illness:  Efraín Reyes is a 47 y.o. year old No obstetric history on file. here for her annual exam. Currently using desogen for birth control. Patient has monthly cycles with normal flow lasting 5-6 days. Admits to spotting a couple weeks following cycle. Denies any irregular menstrual bleeding.      LMP: 23  Frequency: monthly,    Cycle Length: 7 days   Flow: normal  Intermenstrual Bleeding: Yes  Postcoital Bleeding: No  Dysmenorrhea: Yes  Sexually Active: Yes   Dyspareunia: No  Contraception: OCP, Desogen   H/o STI: TV  Last pap: 2/10/22 - Neg w/ Neg HPV  H/o Abnormal Pap: Yes , age 20's  Colposcopy: x 2   Gardasil: 0/3   MM22 - Benign  H/o abnl MMG: yes, 30's- dense breast      Review of Systems:  General/Constitutional: Chills denies. Fatigue/weakness denies. Fever denies. Night sweats denies. Hot flashes denies    Respiratory: Cough denies. Hemoptysis denies. SOB denies. Sputum production denies. Wheezing denies .   Cardiovascular: Chest pain denies . Dizziness denies. Palpitations denies. Swelling in hands/feet denies    Gastrointestinal: Abdominal pain denies. Blood in stool denies. Constipation denies. Diarrhea denies. Heartburn denies. Nausea denies. Vomiting denies    Genitourinary: Incontinence denies. Blood in urine denies. Frequent urination denies. Painful urination denies. Urinary urgency denies. Nocturia denies    Gynecologic: Irregular menses admits. Heavy bleeding admits. Painful menses denies. Vaginal discharge denies. Vaginal odor denies. Vaginal itching denies. Vaginal lesion denies. Pelvic pain denies. Decreased libido denies. Vulvar lesion denies. Prolapse of genital organs denies. Painful intercourse denies. Postcoital bleeding denies    Psychiatric: Depression denies. Anxiety denies     OB History   No obstetric history on file.      No  obstetric history on file.    Past Medical History:   Diagnosis Date    Anemia     Benign neoplasm of nerve sheath     Cervical pain     Lumbar radiculopathy     Muscle spasticity     Muscle wasting and atrophy, not elsewhere classified, other site     Thoracic back pain        Current Outpatient Medications:     ascorbic acid, vitamin C, (VITAMIN C) 1000 MG tablet, Take 1 tablet by mouth once daily., Disp: , Rfl:     cholecalciferol, vitamin D3, 125 mcg (5,000 unit) capsule, Take 1 capsule by mouth once daily., Disp: , Rfl:     co-enzyme Q-10 30 mg capsule, Take 30 mg by mouth 3 (three) times daily., Disp: , Rfl:     cyanocobalamin 1,000 mcg/mL injection, INJECT 1ML INTO THE MUSCLE ONCE WEEKLY FOR 12 WEEKS, Disp: , Rfl:     desogestreL-ethinyl estradioL (ISIBLOOM) 0.15-0.03 mg per tablet, TAKE 1 TABLET BY MOUTH DAILY, Disp: 84 tablet, Rfl: 0    ibuprofen (ADVIL,MOTRIN) 800 MG tablet, 1 tablet with food or milk as needed Orally every 8 hrs, Disp: , Rfl:     vitamin E 400 UNIT capsule, Take 400 Units by mouth once daily., Disp: , Rfl:     zinc acetate 50 mg (zinc) Cap, Take 1 capsule by mouth once daily., Disp: , Rfl:     cetirizine (ZYRTEC) 10 MG tablet, Take 10 mg by mouth once daily., Disp: , Rfl:     eszopiclone (LUNESTA) 3 mg Tab, Take 3 mg by mouth nightly., Disp: , Rfl:     fluticasone propionate (FLONASE) 50 mcg/actuation nasal spray, SHAKE LIQUID AND USE 2 SPRAYS IN EACH NOSTRIL DAILY, Disp: , Rfl:     pregabalin (LYRICA) 150 MG capsule, Take 150 mg by mouth 2 (two) times daily., Disp: , Rfl:     Review of patient's allergies indicates:  No Known Allergies    Past Surgical History:   Procedure Laterality Date    C6-T1 LAMINECTOMIES W/ EXCISION OF RIGHT C8 SCHWANNOMA  05/01/2020    Dr. Orlando     Family History   Problem Relation Age of Onset    Stroke Father     Breast cancer Neg Hx     Colon cancer Neg Hx     Ovarian cancer Neg Hx     Uterine cancer Neg Hx     Cervical cancer Neg Hx      Social History  "    Socioeconomic History    Marital status:    Tobacco Use    Smoking status: Never     Passive exposure: Never    Smokeless tobacco: Never   Substance and Sexual Activity    Alcohol use: Never    Drug use: Never    Sexual activity: Yes     Partners: Male     Birth control/protection: OCP     Comment: STI: Trich       Physical Exam:  /74 (BP Location: Right arm, Patient Position: Sitting, BP Method: Medium (Manual))   Temp 97.9 °F (36.6 °C) (Temporal)   Ht 5' 8" (1.727 m)   Wt 89.6 kg (197 lb 8.5 oz)   LMP 05/08/2023 (Exact Date)   BMI 30.03 kg/m²     Chaperone: present.       General appearance: healthy, well-nourished and well-developed     Psychiatric: Orientation to time, place and person. Normal mood and affect and active, alert     Skin: Appearance: no rashes or lesions.     Neck:   Neck: supple, FROM, trachea midline. and no masses   Thyroid: no enlargement or nodules and non-tender.       Cardiovascular:   Auscultation: RRR and no murmur.   Peripheral Vascular: no varicosities, LLE edema, RLE edema, calf tenderness, and palpable cord and pedal pulses intact.     Lungs:   Respiratory effort: no intercostal retractions or accessory muscle usage.   Auscultation: no wheezing, rales/crackles, or rhonchi and clear to auscultation.     Breast:   Inspection/Palpation: no tenderness, discrete/distinct masses, skin changes, or abnormal secretions. Nipple appearance normal.     Abdomen:   Auscultation/Inspection/Palpation: no hepatomegaly, splenomegaly, masses, tenderness or CVA tenderness and soft, non-distended bowel sounds preset.    Hernia: no palpable hernias.     Female Genitalia:    Vulva: no masses, tenderness or lesions    Bladder/Urethra: no urethral discharge or mass, normal meatus, bladder non-distended.    Vagina: no tenderness, erythema, cystocele, rectocele, abnormal vaginal discharge or vesicle(s) or ulcers    Cervix: no discharge, no cervical lacerations noted or motion tenderness " and grossly normal    Uterus: diffusely enlarged non-tender, and no uterine prolapse.    Adnexa/Parametria: no parametrial tenderness or mass, no adnexal tenderness or ovarian mass.     Lymph Nodes:   Palpation: non tender submandibular nodes, axillary nodes, or inguinal nodes.     Rectal Exam:   Rectum: normal perianal skin.       Assessment/Plan:  1. Well woman exam  Pap gc/c/tv  Advised patient if she notices any changes to her breasts, including a lump, mass, dimpling, discharge, rash or tenderness, to should contact us immediately  Healthy diet, exercise   MMG  Multivitamin   Seat belt   Sunscreen use   Safe sex/ STI education  Annual labs: w/ PCP Dr. Long  C-scope: no history - educated will dw PCP      2. Menometrorrhagia/fibroids   Educated    Bleeding/ER precautions    RTS pelvis    Oneswab: myco/urea     Repeat RTS to compare       US pelvis 11/29/21  - uterus: 9.5 x 6.1 x 8.4 cm; ill-defined, intramural fibroids measuring as large as 4.0 cm   - ES: 1.9 cm  - ROV: 4.3 x 1.6 x 2.6 cm; A 1.7 cm cyst with internal echogenic debris  - LOV: 2.2 x 1.2 x 2.5 cm    Fu 2 wks

## 2023-05-12 DIAGNOSIS — Z30.41 ORAL CONTRACEPTIVE USE: ICD-10-CM

## 2023-05-12 RX ORDER — DESOGESTREL AND ETHINYL ESTRADIOL 0.15-0.03
KIT ORAL
Qty: 84 TABLET | Refills: 0 | Status: SHIPPED | OUTPATIENT
Start: 2023-05-12 | End: 2023-08-17

## 2023-05-15 LAB — PSYCHE PATHOLOGY RESULT: NORMAL

## 2023-05-19 ENCOUNTER — APPOINTMENT (OUTPATIENT)
Dept: RADIOLOGY | Facility: HOSPITAL | Age: 48
End: 2023-05-19
Attending: NEUROLOGICAL SURGERY
Payer: COMMERCIAL

## 2023-05-19 DIAGNOSIS — D36.10 BENIGN NERVE SHEATH NEOPLASM: ICD-10-CM

## 2023-05-19 PROCEDURE — A9577 INJ MULTIHANCE: HCPCS | Performed by: NEUROLOGICAL SURGERY

## 2023-05-19 PROCEDURE — 25500020 PHARM REV CODE 255: Performed by: NEUROLOGICAL SURGERY

## 2023-05-19 PROCEDURE — 72156 MRI NECK SPINE W/O & W/DYE: CPT | Mod: TC

## 2023-05-19 RX ADMIN — GADOBENATE DIMEGLUMINE 20 ML: 529 INJECTION, SOLUTION INTRAVENOUS at 11:05

## 2023-05-24 ENCOUNTER — HOSPITAL ENCOUNTER (OUTPATIENT)
Dept: RADIOLOGY | Facility: HOSPITAL | Age: 48
Discharge: HOME | End: 2023-05-24
Attending: OBSTETRICS & GYNECOLOGY
Payer: COMMERCIAL

## 2023-05-24 ENCOUNTER — OFFICE VISIT (OUTPATIENT)
Dept: NEUROSURGERY | Facility: CLINIC | Age: 48
End: 2023-05-24
Payer: COMMERCIAL

## 2023-05-24 VITALS
BODY MASS INDEX: 30.46 KG/M2 | HEIGHT: 68 IN | SYSTOLIC BLOOD PRESSURE: 123 MMHG | DIASTOLIC BLOOD PRESSURE: 77 MMHG | HEART RATE: 71 BPM | WEIGHT: 201 LBS | RESPIRATION RATE: 16 BRPM

## 2023-05-24 VITALS — WEIGHT: 197 LBS | HEIGHT: 68 IN | BODY MASS INDEX: 29.86 KG/M2

## 2023-05-24 DIAGNOSIS — N92.1 MENOMETRORRHAGIA: ICD-10-CM

## 2023-05-24 DIAGNOSIS — N92.1 MENOMETRORRHAGIA: Primary | ICD-10-CM

## 2023-05-24 DIAGNOSIS — D36.10 BENIGN NEOPLASM OF NERVE SHEATH: Primary | ICD-10-CM

## 2023-05-24 DIAGNOSIS — Z12.31 ENCOUNTER FOR SCREENING MAMMOGRAM FOR MALIGNANT NEOPLASM OF BREAST: ICD-10-CM

## 2023-05-24 PROCEDURE — 3074F SYST BP LT 130 MM HG: CPT | Mod: CPTII,,, | Performed by: NEUROLOGICAL SURGERY

## 2023-05-24 PROCEDURE — 99213 PR OFFICE/OUTPT VISIT, EST, LEVL III, 20-29 MIN: ICD-10-PCS | Mod: ,,, | Performed by: NEUROLOGICAL SURGERY

## 2023-05-24 PROCEDURE — 3008F BODY MASS INDEX DOCD: CPT | Mod: CPTII,,, | Performed by: NEUROLOGICAL SURGERY

## 2023-05-24 PROCEDURE — 99213 OFFICE O/P EST LOW 20 MIN: CPT | Mod: ,,, | Performed by: NEUROLOGICAL SURGERY

## 2023-05-24 PROCEDURE — 1159F PR MEDICATION LIST DOCUMENTED IN MEDICAL RECORD: ICD-10-PCS | Mod: CPTII,,, | Performed by: NEUROLOGICAL SURGERY

## 2023-05-24 PROCEDURE — 1160F PR REVIEW ALL MEDS BY PRESCRIBER/CLIN PHARMACIST DOCUMENTED: ICD-10-PCS | Mod: CPTII,,, | Performed by: NEUROLOGICAL SURGERY

## 2023-05-24 PROCEDURE — 1160F RVW MEDS BY RX/DR IN RCRD: CPT | Mod: CPTII,,, | Performed by: NEUROLOGICAL SURGERY

## 2023-05-24 PROCEDURE — 3074F PR MOST RECENT SYSTOLIC BLOOD PRESSURE < 130 MM HG: ICD-10-PCS | Mod: CPTII,,, | Performed by: NEUROLOGICAL SURGERY

## 2023-05-24 PROCEDURE — 77067 SCR MAMMO BI INCL CAD: CPT | Mod: TC

## 2023-05-24 PROCEDURE — 1159F MED LIST DOCD IN RCRD: CPT | Mod: CPTII,,, | Performed by: NEUROLOGICAL SURGERY

## 2023-05-24 PROCEDURE — 3008F PR BODY MASS INDEX (BMI) DOCUMENTED: ICD-10-PCS | Mod: CPTII,,, | Performed by: NEUROLOGICAL SURGERY

## 2023-05-24 PROCEDURE — 76856 US EXAM PELVIC COMPLETE: CPT | Mod: TC

## 2023-05-24 PROCEDURE — 3078F PR MOST RECENT DIASTOLIC BLOOD PRESSURE < 80 MM HG: ICD-10-PCS | Mod: CPTII,,, | Performed by: NEUROLOGICAL SURGERY

## 2023-05-24 PROCEDURE — 3078F DIAST BP <80 MM HG: CPT | Mod: CPTII,,, | Performed by: NEUROLOGICAL SURGERY

## 2023-05-24 RX ORDER — FERROUS SULFATE 325(65) MG
325 TABLET, DELAYED RELEASE (ENTERIC COATED) ORAL DAILY
COMMUNITY

## 2023-05-24 NOTE — PROGRESS NOTES
[facility]  Neurosurgery        Efraín Reyes   [id number]   1975       SUBJECTIVE:     CHIEF COMPLAINT:    3 years post-op    HPI:    Efraín Reyes is a 47 y.o.-year-old female who presents today for post-operative follow-up.  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  She continues with heaviness across the tops of the shoulders and intermittent cramping in the right hand and last two fingers.  These symptoms have been present since surgery.  They are not worsening, but have persisted.  She has stopped going to pain management due to cost.  She says that overall she feels good.          Review of patient's allergies indicates:  No Known Allergies  Current Outpatient Medications   Medication Sig Dispense Refill    ascorbic acid, vitamin C, (VITAMIN C) 1000 MG tablet Take 1 tablet by mouth once daily.      cholecalciferol, vitamin D3, 125 mcg (5,000 unit) capsule Take 1 capsule by mouth once daily.      co-enzyme Q-10 30 mg capsule Take 30 mg by mouth 3 (three) times daily.      cyanocobalamin 1,000 mcg/mL injection INJECT 1ML INTO THE MUSCLE ONCE WEEKLY FOR 12 WEEKS      ferrous sulfate 325 (65 FE) MG EC tablet Take 325 mg by mouth once daily.      ibuprofen (ADVIL,MOTRIN) 800 MG tablet 1 tablet with food or milk as needed Orally every 8 hrs      ISIBLOOM 0.15-0.03 mg per tablet TAKE 1 TABLET BY MOUTH DAILY 84 tablet 0    vitamin E 400 UNIT capsule Take 400 Units by mouth once daily.      zinc acetate 50 mg (zinc) Cap Take 1 capsule by mouth once daily.      cetirizine (ZYRTEC) 10 MG tablet Take 10 mg by mouth once daily.      eszopiclone (LUNESTA) 3 mg Tab Take 3 mg by mouth nightly.      fluticasone propionate (FLONASE) 50 mcg/actuation nasal spray SHAKE LIQUID AND USE 2 SPRAYS IN EACH NOSTRIL DAILY      pregabalin (LYRICA) 150 MG capsule Take 150 mg by mouth 2 (two) times  "daily.       No current facility-administered medications for this visit.     Past Medical History:   Diagnosis Date    Anemia     Benign neoplasm of nerve sheath     Cervical pain     Lumbar radiculopathy     Muscle spasticity     Muscle wasting and atrophy, not elsewhere classified, other site     Thoracic back pain      Past Surgical History:   Procedure Laterality Date    C6-T1 LAMINECTOMIES W/ EXCISION OF RIGHT C8 SCHWANNOMA  05/01/2020    Dr. Orlando     Family History       Problem Relation (Age of Onset)    Diabetes Mother    Heart disease Mother, Father    Hyperlipidemia Mother, Father    Kidney disease Mother    Peripheral vascular disease Mother    Stroke Father          Social History     Socioeconomic History    Marital status:    Tobacco Use    Smoking status: Never     Passive exposure: Never    Smokeless tobacco: Never   Substance and Sexual Activity    Alcohol use: Yes     Comment: occasional    Drug use: Never    Sexual activity: Yes     Partners: Male     Birth control/protection: OCP     Comment: STI: Trich       Review of systems:  Pertinent items are noted in HPI    OBJECTIVE:     Vital Signs  Pulse: 71  Resp: 16  BP: 123/77  Pain Score:   2  Height: 5' 8" (172.7 cm)  Weight: 91.2 kg (201 lb)  Body mass index is 30.56 kg/m².      Physical Exam:    General:  Pleasant. Well-nourished. Alert. No acute distress.    Head:  Normocephalic, without obvious abnormality, atraumatic    Lungs:   Breathing is quiet, non-lablored    Neurological:    Oriented to Person, Place, Time   Speech:  normal  Memory, cognition, and affect are appropriate.  Motor Strength: Moves all extremities spontaneously with good tone.  No abnormal movements seen.      Cervical incision:  Well healed    Gait:  normal    3 year postop MRI shows no evidence of residual/recurrent tumor    ASSESSMENT/PLAN:     1. Benign neoplasm of nerve sheath  - MRI Cervical Spine W WO Cont; Future  - Follow up in 2 yrs with MRI (5yrs post op) "           I, Dr. Derick Orlando, personally performed the services described in this documentation. All medical record entries made by the scribe, Elsie Medrano RN, were at my direction and in my presence.  I have reviewed the chart and agree that the record reflects my personal performance and is accurate and complete. Derick Orlando MD.  4:17 PM 05/24/2023           Derick Orlando MD FACS FAANS

## 2023-06-07 NOTE — PROGRESS NOTES
Chief Complaint:  Follow up results (F/u on US results)    History of Present Illness:  Patient is a 47 y.o.  presents to discuss recent pelvic imaging secondary to menometrorrhagia and uterine fibroids.      LMP: 06/3/23  Frequency: monthly,    Cycle Length: 7 days   Flow: normal  Intermenstrual Bleeding: Yes  Postcoital Bleeding: No  Dysmenorrhea: Yes  Sexually Active: Yes   Dyspareunia: No  Contraception: OCP, Desogen   H/o STI: TV  Last pap: 23, nml. Neg HPV,   H/o Abnormal Pap: Yes , age 20's  Colposcopy: x 2   Gardasil: 0/3   MM23 - Benign  H/o abnl MMG: yes, 30's- dense breast      Review of systems:  General/Constitutional: Chills denies. Fatigue/weakness denies. Fever denies. Night sweats denies. Hot flashes denies  Gastrointestinal: Abdominal pain denies. Blood in stool denies. Constipation denies. Diarrhea denies. Heartburn denies. Nausea denies. Vomiting denies   Genitourinary: Incontinence denies. Blood in urine denies. Frequent urination denies. Urgency denies. Painful urination denies. Nocturia denies   Gynecologic: Irregular menses denies. Heavy bleeding  denies. Painful menses denies. Vaginal discharge denies.Vaginal odor denies. Vaginal itching/Irritation denies. Vaginal lesion denies.  Pelvic pain denies. Decreased libido denies. Vulvar lesion denies. Prolapse of genital organs denies. Painful intercourse denies. Postcoital bleeding denies   Psychiatric: Mood lability denies. Depressed mood denies. Suicidal thoughts denies. Anxiety denies. Overwhelmed denies. Appetite normal. Energy level normal     OB History    Para Term  AB Living   1 1 1 0 0 0   SAB IAB Ectopic Multiple Live Births   0 0 0 0 0      # 1 - Date: [birth year], Sex: Female, Weight: 3.515 kg (7 lb 12 oz), GA: None, Delivery: Vaginal, Spontaneous, Apgar1: None, Apgar5: None, Living: None, Birth Comments: None      Past Medical History:   Diagnosis Date    Anemia     Benign neoplasm of nerve sheath      "Cervical pain     Lumbar radiculopathy     Muscle spasticity     Muscle wasting and atrophy, not elsewhere classified, other site     Thoracic back pain        Current Outpatient Medications:     ascorbic acid, vitamin C, (VITAMIN C) 1000 MG tablet, Take 1 tablet by mouth once daily., Disp: , Rfl:     cetirizine (ZYRTEC) 10 MG tablet, Take 10 mg by mouth once daily., Disp: , Rfl:     cholecalciferol, vitamin D3, 125 mcg (5,000 unit) capsule, Take 1 capsule by mouth once daily., Disp: , Rfl:     co-enzyme Q-10 30 mg capsule, Take 30 mg by mouth 3 (three) times daily., Disp: , Rfl:     cyanocobalamin 1,000 mcg/mL injection, INJECT 1ML INTO THE MUSCLE ONCE WEEKLY FOR 12 WEEKS, Disp: , Rfl:     eszopiclone (LUNESTA) 3 mg Tab, Take 3 mg by mouth nightly., Disp: , Rfl:     ferrous sulfate 325 (65 FE) MG EC tablet, Take 325 mg by mouth once daily., Disp: , Rfl:     fluticasone propionate (FLONASE) 50 mcg/actuation nasal spray, SHAKE LIQUID AND USE 2 SPRAYS IN EACH NOSTRIL DAILY, Disp: , Rfl:     ibuprofen (ADVIL,MOTRIN) 800 MG tablet, 1 tablet with food or milk as needed Orally every 8 hrs, Disp: , Rfl:     ISIBLOOM 0.15-0.03 mg per tablet, TAKE 1 TABLET BY MOUTH DAILY, Disp: 84 tablet, Rfl: 0    pregabalin (LYRICA) 150 MG capsule, Take 150 mg by mouth 2 (two) times daily., Disp: , Rfl:     vitamin E 400 UNIT capsule, Take 400 Units by mouth once daily., Disp: , Rfl:     zinc acetate 50 mg (zinc) Cap, Take 1 capsule by mouth once daily., Disp: , Rfl:       Physical Exam:  /72 (BP Location: Left arm, Patient Position: Sitting, BP Method: Large (Manual))   Temp 97.2 °F (36.2 °C) (Temporal)   Ht 5' 8" (1.727 m)   Wt 90.1 kg (198 lb 10.2 oz)   LMP 06/03/2023 (Exact Date)   BMI 30.20 kg/m²     Constitutional: General appearance: healthy, well-nourished and well-developed   Psychiatric: Orientation to time, place and person. Normal mood and affect and active, alert       Assessment/Plan:  1. Menometrorrhagia/Uterine " Fibroids  Educated  Reviewed imaging with patient, see below    Reviewed etiology of irregular menses and discussed will check labs and perform US.        Explained to patient need to sample endometrium to r/o precancerous and cancerous lesions. Pt offered office EMB vs Hysteroscope D&C. Pt states understanding.       Patient desires EMB  RTC for procedure Friday 6/16/23        US pelvis  5/24/23  - Uterus:  8.0 x 5.3 x 8.8 cm; multiple (at least 3) benign-appearing intramural fibroids measuring as large as 4 cm  - ES: 1.5 cm   - ROV: 3.5 x 2.3 x 2.4 cm  - LOV: 2.2 x 1.1 x 1.9 cm; 2 small (1.6 cm or less), moderately tender, benign-appearing, simple cysts   - Free fluid: small amount of free fluid is noted within the cul-de-sac      US pelvis 11/29/21  - uterus: 9.5 x 6.1 x 8.4 cm; ill-defined, intramural fibroids measuring as large as 4.0 cm   - ES: 1.9 cm  - ROV: 4.3 x 1.6 x 2.6 cm; A 1.7 cm cyst with internal echogenic debris  - LOV: 2.2 x 1.2 x 2.5 cm    5/11/23  - Pap: Negative cytology, neg HPV, neg gc/cz/tv  - Cultures: neg nyco/urea

## 2023-06-08 ENCOUNTER — OFFICE VISIT (OUTPATIENT)
Dept: OBSTETRICS AND GYNECOLOGY | Facility: CLINIC | Age: 48
End: 2023-06-08
Payer: COMMERCIAL

## 2023-06-08 VITALS
WEIGHT: 198.63 LBS | HEIGHT: 68 IN | DIASTOLIC BLOOD PRESSURE: 72 MMHG | SYSTOLIC BLOOD PRESSURE: 112 MMHG | BODY MASS INDEX: 30.1 KG/M2 | TEMPERATURE: 97 F

## 2023-06-08 DIAGNOSIS — N92.1 MENOMETRORRHAGIA: ICD-10-CM

## 2023-06-08 PROCEDURE — 3074F SYST BP LT 130 MM HG: CPT | Mod: CPTII,,, | Performed by: OBSTETRICS & GYNECOLOGY

## 2023-06-08 PROCEDURE — 3078F PR MOST RECENT DIASTOLIC BLOOD PRESSURE < 80 MM HG: ICD-10-PCS | Mod: CPTII,,, | Performed by: OBSTETRICS & GYNECOLOGY

## 2023-06-08 PROCEDURE — 1159F PR MEDICATION LIST DOCUMENTED IN MEDICAL RECORD: ICD-10-PCS | Mod: CPTII,,, | Performed by: OBSTETRICS & GYNECOLOGY

## 2023-06-08 PROCEDURE — 99214 PR OFFICE/OUTPT VISIT, EST, LEVL IV, 30-39 MIN: ICD-10-PCS | Mod: ,,, | Performed by: OBSTETRICS & GYNECOLOGY

## 2023-06-08 PROCEDURE — 1159F MED LIST DOCD IN RCRD: CPT | Mod: CPTII,,, | Performed by: OBSTETRICS & GYNECOLOGY

## 2023-06-08 PROCEDURE — 3008F BODY MASS INDEX DOCD: CPT | Mod: CPTII,,, | Performed by: OBSTETRICS & GYNECOLOGY

## 2023-06-08 PROCEDURE — 3074F PR MOST RECENT SYSTOLIC BLOOD PRESSURE < 130 MM HG: ICD-10-PCS | Mod: CPTII,,, | Performed by: OBSTETRICS & GYNECOLOGY

## 2023-06-08 PROCEDURE — 3078F DIAST BP <80 MM HG: CPT | Mod: CPTII,,, | Performed by: OBSTETRICS & GYNECOLOGY

## 2023-06-08 PROCEDURE — 99214 OFFICE O/P EST MOD 30 MIN: CPT | Mod: ,,, | Performed by: OBSTETRICS & GYNECOLOGY

## 2023-06-08 PROCEDURE — 3008F PR BODY MASS INDEX (BMI) DOCUMENTED: ICD-10-PCS | Mod: CPTII,,, | Performed by: OBSTETRICS & GYNECOLOGY

## 2023-08-14 DIAGNOSIS — Z30.41 ORAL CONTRACEPTIVE USE: ICD-10-CM

## 2023-08-17 RX ORDER — DESOGESTREL AND ETHINYL ESTRADIOL 0.15-0.03
KIT ORAL
Qty: 84 TABLET | Refills: 3 | Status: SHIPPED | OUTPATIENT
Start: 2023-08-17 | End: 2023-12-07

## 2023-10-13 DIAGNOSIS — D70.9 NEUTROPENIA: Primary | ICD-10-CM

## 2023-10-25 ENCOUNTER — OFFICE VISIT (OUTPATIENT)
Dept: HEMATOLOGY/ONCOLOGY | Facility: CLINIC | Age: 48
End: 2023-10-25
Payer: COMMERCIAL

## 2023-10-25 VITALS
OXYGEN SATURATION: 100 % | HEIGHT: 68 IN | SYSTOLIC BLOOD PRESSURE: 134 MMHG | RESPIRATION RATE: 20 BRPM | WEIGHT: 212 LBS | HEART RATE: 81 BPM | BODY MASS INDEX: 32.13 KG/M2 | DIASTOLIC BLOOD PRESSURE: 75 MMHG | TEMPERATURE: 98 F

## 2023-10-25 DIAGNOSIS — D70.9 NEUTROPENIA, UNSPECIFIED TYPE: ICD-10-CM

## 2023-10-25 DIAGNOSIS — D70.0 CONGENITAL NEUTROPENIA: Primary | ICD-10-CM

## 2023-10-25 DIAGNOSIS — D70.0 CONGENITAL NEUTROPENIA: ICD-10-CM

## 2023-10-25 DIAGNOSIS — D50.0 IRON DEFICIENCY ANEMIA DUE TO CHRONIC BLOOD LOSS: ICD-10-CM

## 2023-10-25 DIAGNOSIS — N92.0 MENORRHAGIA WITH REGULAR CYCLE: Primary | ICD-10-CM

## 2023-10-25 PROCEDURE — 1159F MED LIST DOCD IN RCRD: CPT | Mod: CPTII,S$GLB,, | Performed by: INTERNAL MEDICINE

## 2023-10-25 PROCEDURE — 3008F BODY MASS INDEX DOCD: CPT | Mod: CPTII,S$GLB,, | Performed by: INTERNAL MEDICINE

## 2023-10-25 PROCEDURE — 3078F DIAST BP <80 MM HG: CPT | Mod: CPTII,S$GLB,, | Performed by: INTERNAL MEDICINE

## 2023-10-25 PROCEDURE — 3008F PR BODY MASS INDEX (BMI) DOCUMENTED: ICD-10-PCS | Mod: CPTII,S$GLB,, | Performed by: INTERNAL MEDICINE

## 2023-10-25 PROCEDURE — 3075F SYST BP GE 130 - 139MM HG: CPT | Mod: CPTII,S$GLB,, | Performed by: INTERNAL MEDICINE

## 2023-10-25 PROCEDURE — 99999 PR PBB SHADOW E&M-EST. PATIENT-LVL IV: CPT | Mod: PBBFAC,,, | Performed by: INTERNAL MEDICINE

## 2023-10-25 PROCEDURE — 1159F PR MEDICATION LIST DOCUMENTED IN MEDICAL RECORD: ICD-10-PCS | Mod: CPTII,S$GLB,, | Performed by: INTERNAL MEDICINE

## 2023-10-25 PROCEDURE — 99203 PR OFFICE/OUTPT VISIT, NEW, LEVL III, 30-44 MIN: ICD-10-PCS | Mod: S$GLB,,, | Performed by: INTERNAL MEDICINE

## 2023-10-25 PROCEDURE — 99999 PR PBB SHADOW E&M-EST. PATIENT-LVL IV: ICD-10-PCS | Mod: PBBFAC,,, | Performed by: INTERNAL MEDICINE

## 2023-10-25 PROCEDURE — 99203 OFFICE O/P NEW LOW 30 MIN: CPT | Mod: S$GLB,,, | Performed by: INTERNAL MEDICINE

## 2023-10-25 PROCEDURE — 3078F PR MOST RECENT DIASTOLIC BLOOD PRESSURE < 80 MM HG: ICD-10-PCS | Mod: CPTII,S$GLB,, | Performed by: INTERNAL MEDICINE

## 2023-10-25 PROCEDURE — 3075F PR MOST RECENT SYSTOLIC BLOOD PRESS GE 130-139MM HG: ICD-10-PCS | Mod: CPTII,S$GLB,, | Performed by: INTERNAL MEDICINE

## 2023-10-25 RX ORDER — FOLIC ACID 0.8 MG
800 TABLET ORAL DAILY
COMMUNITY

## 2023-10-25 RX ORDER — PREDNISONE 20 MG/1
20 TABLET ORAL DAILY
Qty: 2 TABLET | Refills: 0 | Status: SHIPPED | OUTPATIENT
Start: 2023-10-25 | End: 2023-12-07

## 2023-10-25 RX ORDER — VITAMIN A 3000 MCG
10000 CAPSULE ORAL DAILY
COMMUNITY

## 2023-10-25 NOTE — PROGRESS NOTES
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 She sees Dr. Schmidt and was referred to another GYN for hysterectomy who did not take her insurance. Patient has a long standing history of neutropenia dating back to her 20s. Documented Neutropenia in available labs dating back at least 7 years. Patient is actively taking oral iron 85mg of elemental iron, folate B12 and other vitamins. She has received IV iron infusions last year and the year before at [facility] area.  LABS:    10/25/23 :  WBC 2.95, ANC 1.5, HGB 10.2, HCT 32.5, MCV 88, PLT 231K  10/2/23 WBC 2.88, HGB 11.14, HCT 34.8, MCV 90, [id number], iron 84, iron sat 28, folate > 20, ferritin 41, B12 771, CMP WNL except total bili 1.4retic 1.55  22 WBC 3.4, ANC   11 WBC 4.8, ANC 2.07 WBC 3.8, AC 2.0    23 pelvic US:  Uterus: The uterus measures 8.0 x 5.3 x 8.8 cm with multiple (at least 3) benign-appearing intramural fibroids measuring as large as 4 cm in greatest diameter.  The endometrial stripe is thickened measuring 1.5 cm in AP thickness on transvaginal imaging.   Ovaries: The right ovary measures 3.5 x 2.3 x 2.4 cm and the left ovary measures 2.2 x 1.1 x 1.9 cm with benign-appearing follicles originating from both ovaries and 2 small (1.6 cm or less), moderately tender, benign-appearing, simple cysts noted originating from the right ovary.    Subjective:   Interval History: Ms. Reyes is a 48 y.o. female who returns for new evaluation and treatment of. neutropenia     Past Medical History:   Past Medical History:   Diagnosis Date    Anemia     Benign neoplasm of nerve sheath     Cervical pain     Lumbar radiculopathy     Muscle spasticity     Muscle wasting  and atrophy, not elsewhere classified, other site     Thoracic back pain    Uterine fibroids    Past Surgical HIstory:   Past Surgical History:   Procedure Laterality Date    C6-T1 LAMINECTOMIES W/ EXCISION OF RIGHT C8 SCHWANNOMA  05/01/2020    Dr. Orlando       Family History:   Family History   Problem Relation Age of Onset    Heart disease Mother     Hyperlipidemia Mother     Kidney disease Mother     Peripheral vascular disease Mother     Diabetes Mother     Stroke Father     Heart disease Father     Hyperlipidemia Father     Breast cancer Neg Hx     Colon cancer Neg Hx     Ovarian cancer Neg Hx     Uterine cancer Neg Hx     Cervical cancer Neg Hx        Social History:  reports that she has never smoked. She has never been exposed to tobacco smoke. She has never used smokeless tobacco. She reports current alcohol use. She reports that she does not use drugs.    Allergies:  Review of patient's allergies indicates:  No Known Allergies    Medications:  Current Outpatient Medications   Medication Sig Dispense Refill    ascorbic acid, vitamin C, (VITAMIN C) 1000 MG tablet Take 1 tablet by mouth once daily.      cholecalciferol, vitamin D3, 125 mcg (5,000 unit) capsule Take 1 capsule by mouth once daily.      co-enzyme Q-10 30 mg capsule Take 30 mg by mouth 3 (three) times daily.      cyanocobalamin 1,000 mcg/mL injection INJECT 1ML INTO THE MUSCLE ONCE WEEKLY FOR 12 WEEKS      ferrous sulfate 325 (65 FE) MG EC tablet Take 325 mg by mouth once daily.      folic acid (FOLVITE) 800 MCG Tab Take 800 mcg by mouth once daily.      ibuprofen (ADVIL,MOTRIN) 800 MG tablet 1 tablet with food or milk as needed Orally every 8 hrs      vitamin E 400 UNIT capsule Take 400 Units by mouth once daily.      zinc acetate 50 mg (zinc) Cap Take 1 capsule by mouth once daily.      desogestreL-ethinyl estradioL (ISIBLOOM) 0.15-0.03 mg per tablet TAKE 1 TABLET BY MOUTH DAILY (Patient not taking: Reported on 10/25/2023) 84 tablet 3     "predniSONE (DELTASONE) 20 MG tablet Take 1 tablet (20 mg total) by mouth once daily. 2 tablet 0    vitamin A 64902 UNIT capsule Take 10,000 Units by mouth once daily.       No current facility-administered medications for this visit.       Review of Systems   All other systems reviewed and are negative.      ECOG Performance Status:   ECOG SCORE             Objective:      Vitals:   Vitals:    10/25/23 1038   BP: 134/75   BP Location: Right arm   Patient Position: Sitting   Pulse: 81   Resp: 20   Temp: 98.2 °F (36.8 °C)   TempSrc: Oral   SpO2: 100%   Weight: 96.2 kg (212 lb)   Height: 5' 8" (1.727 m)     BMI: Body mass index is 32.23 kg/m².    Physical Exam  Constitutional:       Appearance: Normal appearance. She is normal weight.   HENT:      Head: Normocephalic and atraumatic.   Eyes:      Extraocular Movements: Extraocular movements intact.      Conjunctiva/sclera: Conjunctivae normal.   Cardiovascular:      Rate and Rhythm: Normal rate and regular rhythm.   Pulmonary:      Effort: Pulmonary effort is normal.      Breath sounds: Normal breath sounds.   Abdominal:      General: Abdomen is flat. Bowel sounds are normal.      Palpations: Abdomen is soft.   Musculoskeletal:         General: Normal range of motion.      Cervical back: Normal range of motion and neck supple.   Skin:     General: Skin is warm and dry.   Neurological:      General: No focal deficit present.      Mental Status: She is alert and oriented to person, place, and time.   Psychiatric:         Mood and Affect: Mood normal.         Behavior: Behavior normal.       Laboratory Data:  Lab Visit on 10/25/2023   Component Date Value Ref Range Status    WBC 10/25/2023 2.95 (L)  4.50 - 11.50 x10(3)/mcL Final    RBC 10/25/2023 3.69 (L)  4.20 - 5.40 x10(6)/mcL Final    Hgb 10/25/2023 10.2 (L)  12.0 - 16.0 g/dL Final    Hct 10/25/2023 32.5 (L)  37.0 - 47.0 % Final    MCV 10/25/2023 88.1  80.0 - 94.0 fL Final    MCH 10/25/2023 27.6  27.0 - 31.0 pg Final    " MCHC 10/25/2023 31.4 (L)  33.0 - 36.0 g/dL Final    RDW 10/25/2023 13.8  11.5 - 17.0 % Final    Platelet 10/25/2023 231  130 - 400 x10(3)/mcL Final    MPV 10/25/2023 10.5 (H)  7.4 - 10.4 fL Final    Neut % 10/25/2023 51.5  % Final    Lymph % 10/25/2023 38.0  % Final    Mono % 10/25/2023 7.5  % Final    Eos % 10/25/2023 2.0  % Final    Basophil % 10/25/2023 0.7  % Final    Lymph # 10/25/2023 1.12  0.6 - 4.6 x10(3)/mcL Final    Neut # 10/25/2023 1.52 (L)  2.1 - 9.2 x10(3)/mcL Final    Mono # 10/25/2023 0.22  0.1 - 1.3 x10(3)/mcL Final    Eos # 10/25/2023 0.06  0 - 0.9 x10(3)/mcL Final    Baso # 10/25/2023 0.02  <=0.2 x10(3)/mcL Final    IG# 10/25/2023 0.01  0 - 0.04 x10(3)/mcL Final    IG% 10/25/2023 0.3  % Final         Imaging:   Assessment:     1. Menorrhagia with regular cycle    2. Neutropenia    3. Iron deficiency anemia due to chronic blood loss          Plan:   Will obtain hepatitis panel, HIV copper, ERLIN, ESR, CRP, Complete abdominal US, path review of peripheral smear. Already had a normal B12 and folate. Will give her a 40 mg dose of prednisone and check a CBC next day to see if she demarginates.   History of iron deficiency due to heavy periods secondary to fibroids. Has received IV iron in 2022 and 2021, will repeat iron studies. She is currently on 85 mg of elemental iron a day.   Problem List Items Addressed This Visit          Oncology    Iron deficiency anemia, unspecified     Other Visit Diagnoses       Menorrhagia with regular cycle    -  Primary    Neutropenia        Relevant Medications    predniSONE (DELTASONE) 20 MG tablet

## 2023-10-26 ENCOUNTER — LAB VISIT (OUTPATIENT)
Dept: LAB | Facility: HOSPITAL | Age: 48
End: 2023-10-26
Attending: INTERNAL MEDICINE
Payer: COMMERCIAL

## 2023-10-26 DIAGNOSIS — D70.9 NEUTROPENIA: Primary | ICD-10-CM

## 2023-10-26 LAB
BASOPHILS # BLD AUTO: 0.01 X10(3)/MCL
BASOPHILS NFR BLD AUTO: 0.2 %
EOSINOPHIL # BLD AUTO: 0 X10(3)/MCL (ref 0–0.9)
EOSINOPHIL NFR BLD AUTO: 0 %
ERYTHROCYTE [DISTWIDTH] IN BLOOD BY AUTOMATED COUNT: 13.6 % (ref 11.5–17)
HCT VFR BLD AUTO: 34.1 % (ref 37–47)
HGB BLD-MCNC: 11.1 G/DL (ref 12–16)
IMM GRANULOCYTES # BLD AUTO: 0.02 X10(3)/MCL (ref 0–0.04)
IMM GRANULOCYTES NFR BLD AUTO: 0.4 %
LYMPHOCYTES # BLD AUTO: 0.89 X10(3)/MCL (ref 0.6–4.6)
LYMPHOCYTES NFR BLD AUTO: 17.2 %
MCH RBC QN AUTO: 28.2 PG (ref 27–31)
MCHC RBC AUTO-ENTMCNC: 32.6 G/DL (ref 33–36)
MCV RBC AUTO: 86.8 FL (ref 80–94)
MONOCYTES # BLD AUTO: 0.16 X10(3)/MCL (ref 0.1–1.3)
MONOCYTES NFR BLD AUTO: 3.1 %
NEUTROPHILS # BLD AUTO: 4.09 X10(3)/MCL (ref 2.1–9.2)
NEUTROPHILS NFR BLD AUTO: 79.1 %
PLATELET # BLD AUTO: 256 X10(3)/MCL (ref 130–400)
PMV BLD AUTO: 10.3 FL (ref 7.4–10.4)
RBC # BLD AUTO: 3.93 X10(6)/MCL (ref 4.2–5.4)
WBC # SPEC AUTO: 5.17 X10(3)/MCL (ref 4.5–11.5)

## 2023-10-26 PROCEDURE — 85025 COMPLETE CBC W/AUTO DIFF WBC: CPT

## 2023-10-26 PROCEDURE — 36415 COLL VENOUS BLD VENIPUNCTURE: CPT

## 2023-11-09 ENCOUNTER — HOSPITAL ENCOUNTER (OUTPATIENT)
Dept: RADIOLOGY | Facility: HOSPITAL | Age: 48
Discharge: HOME | End: 2023-11-09
Attending: INTERNAL MEDICINE
Payer: COMMERCIAL

## 2023-11-09 DIAGNOSIS — D70.0 CONGENITAL NEUTROPENIA: ICD-10-CM

## 2023-11-09 PROCEDURE — 76700 US EXAM ABDOM COMPLETE: CPT | Mod: TC

## 2023-11-17 ENCOUNTER — OFFICE VISIT (OUTPATIENT)
Dept: HEMATOLOGY/ONCOLOGY | Facility: CLINIC | Age: 48
End: 2023-11-17
Payer: COMMERCIAL

## 2023-11-17 VITALS
TEMPERATURE: 98 F | BODY MASS INDEX: 32.31 KG/M2 | DIASTOLIC BLOOD PRESSURE: 66 MMHG | WEIGHT: 213.19 LBS | HEIGHT: 68 IN | HEART RATE: 79 BPM | OXYGEN SATURATION: 100 % | SYSTOLIC BLOOD PRESSURE: 135 MMHG | RESPIRATION RATE: 18 BRPM

## 2023-11-17 DIAGNOSIS — D72.819 LEUKOPENIA, UNSPECIFIED TYPE: Primary | ICD-10-CM

## 2023-11-17 DIAGNOSIS — N92.0 MENORRHAGIA WITH REGULAR CYCLE: ICD-10-CM

## 2023-11-17 DIAGNOSIS — D64.9 NORMOCYTIC ANEMIA: ICD-10-CM

## 2023-11-17 PROCEDURE — 99999 PR PBB SHADOW E&M-EST. PATIENT-LVL IV: CPT | Mod: PBBFAC,,, | Performed by: INTERNAL MEDICINE

## 2023-11-17 NOTE — PROGRESS NOTES
PATIENT: Efraín Reyes  MRN: [id number]  DATE: 2023   1975      Chief Complaint: No chief complaint on file.      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 She sees Dr. Schmidt and was referred to another GYN for hysterectomy who did not take her insurance. Patient has a long standing history of neutropenia dating back to her 20s. Documented Neutropenia in available labs dating back at least 7 years. Patient is actively taking oral iron 85mg of elemental iron, folate B12 and other vitamins. She has received IV iron infusions last year and the year before at [facility] area.  LABS:    10/25/23 :  WBC 2.95, ANC 1.5, HGB 10.2, HCT 32.5, MCV 88, PLT 231K  10/2/23 WBC 2.88, HGB 11.14, HCT 34.8, MCV 90, URS542, iron 84, iron sat 28, folate > 20, ferritin 41, B12 771, CMP WNL except total bili 1.4retic 1.55  22 WBC 3.4, ANC   21 WBC 4.8, ANC 2.07 WBC 3.8, AC 2.0    23 pelvic US:  Uterus: The uterus measures 8.0 x 5.3 x 8.8 cm with multiple (at least 3) benign-appearing intramural fibroids measuring as large as 4 cm in greatest diameter.  The endometrial stripe is thickened measuring 1.5 cm in AP thickness on transvaginal imaging.   Ovaries: The right ovary measures 3.5 x 2.3 x 2.4 cm and the left ovary measures 2.2 x 1.1 x 1.9 cm with benign-appearing follicles originating from both ovaries and 2 small (1.6 cm or less), moderately tender, benign-appearing, simple cysts noted originating from the right ovary.    Subjective:   Interval History: Ms. Reyes is a 48 y.o. female who returns for new evaluation and treatment of. neutropenia     Past Medical History:   Past Medical History:   Diagnosis Date    Anemia     Benign neoplasm of nerve sheath     Cervical pain     Lumbar radiculopathy     Muscle spasticity     Muscle wasting and  atrophy, not elsewhere classified, other site     Thoracic back pain    Uterine fibroids    Past Surgical HIstory:   Past Surgical History:   Procedure Laterality Date    C6-T1 LAMINECTOMIES W/ EXCISION OF RIGHT C8 SCHWANNOMA  05/01/2020    Dr. Orlando       Family History:   Family History   Problem Relation Age of Onset    Heart disease Mother     Hyperlipidemia Mother     Kidney disease Mother     Peripheral vascular disease Mother     Diabetes Mother     Stroke Father     Heart disease Father     Hyperlipidemia Father     Breast cancer Neg Hx     Colon cancer Neg Hx     Ovarian cancer Neg Hx     Uterine cancer Neg Hx     Cervical cancer Neg Hx        Social History:  reports that she has never smoked. She has never been exposed to tobacco smoke. She has never used smokeless tobacco. She reports current alcohol use. She reports that she does not use drugs.    Allergies:  Review of patient's allergies indicates:  No Known Allergies    Medications:  Current Outpatient Medications   Medication Sig Dispense Refill    ascorbic acid, vitamin C, (VITAMIN C) 1000 MG tablet Take 1 tablet by mouth once daily.      cholecalciferol, vitamin D3, 125 mcg (5,000 unit) capsule Take 1 capsule by mouth once daily.      co-enzyme Q-10 30 mg capsule Take 30 mg by mouth 3 (three) times daily.      cyanocobalamin 1,000 mcg/mL injection INJECT 1ML INTO THE MUSCLE ONCE WEEKLY FOR 12 WEEKS      desogestreL-ethinyl estradioL (ISIBLOOM) 0.15-0.03 mg per tablet TAKE 1 TABLET BY MOUTH DAILY (Patient not taking: Reported on 10/25/2023) 84 tablet 3    ferrous sulfate 325 (65 FE) MG EC tablet Take 325 mg by mouth once daily.      folic acid (FOLVITE) 800 MCG Tab Take 800 mcg by mouth once daily.      ibuprofen (ADVIL,MOTRIN) 800 MG tablet 1 tablet with food or milk as needed Orally every 8 hrs      predniSONE (DELTASONE) 20 MG tablet Take 1 tablet (20 mg total) by mouth once daily. 2 tablet 0    vitamin A 70463 UNIT  capsule Take 10,000 Units by mouth once daily.      vitamin E 400 UNIT capsule Take 400 Units by mouth once daily.      zinc acetate 50 mg (zinc) Cap Take 1 capsule by mouth once daily.       No current facility-administered medications for this visit.       Review of Systems   All other systems reviewed and are negative.      ECOG Performance Status:   ECOG SCORE           Objective:      Vitals:   There were no vitals filed for this visit.    BMI: There is no height or weight on file to calculate BMI.    Physical Exam  Constitutional:       Appearance: Normal appearance. She is normal weight.   HENT:      Head: Normocephalic and atraumatic.   Eyes:      Extraocular Movements: Extraocular movements intact.      Conjunctiva/sclera: Conjunctivae normal.   Cardiovascular:      Rate and Rhythm: Normal rate and regular rhythm.   Pulmonary:      Effort: Pulmonary effort is normal.      Breath sounds: Normal breath sounds.   Abdominal:      General: Abdomen is flat. Bowel sounds are normal.      Palpations: Abdomen is soft.   Musculoskeletal:         General: Normal range of motion.      Cervical back: Normal range of motion and neck supple.   Skin:     General: Skin is warm and dry.   Neurological:      General: No focal deficit present.      Mental Status: She is alert and oriented to person, place, and time.   Psychiatric:         Mood and Affect: Mood normal.         Behavior: Behavior normal.     Laboratory Data:  Lab Visit on 11/17/2023   Component Date Value Ref Range Status    Iron Binding Capacity Unsaturated 11/17/2023 172  70 - 310 ug/dL Final    Iron Level 11/17/2023 102  50 - 170 ug/dL Final    Iron Binding Capacity Total 11/17/2023 274  250 - 450 ug/dL Final    Iron Saturation 11/17/2023 37  20 - 50 % Final    Ferritin Level 11/17/2023 33.94  4.63 - 204.00 ng/mL Final    Sed Rate 11/17/2023 19  0 - 20 mm/hr Final    WBC 11/17/2023 3.33 (L)  4.50 - 11.50 x10(3)/mcL Final    RBC 11/17/2023 4.03 (L)   4.20 - 5.40 x10(6)/mcL Final    Hgb 11/17/2023 11.0 (L)  12.0 - 16.0 g/dL Final    Hct 11/17/2023 35.1 (L)  37.0 - 47.0 % Final    MCV 11/17/2023 87.1  80.0 - 94.0 fL Final    MCH 11/17/2023 27.3  27.0 - 31.0 pg Final    MCHC 11/17/2023 31.3 (L)  33.0 - 36.0 g/dL Final    RDW 11/17/2023 14.0  11.5 - 17.0 % Final    Platelet 11/17/2023 256  130 - 400 x10(3)/mcL Final    MPV 11/17/2023 10.4  7.4 - 10.4 fL Final    Neut % 11/17/2023 51.1  % Final    Lymph % 11/17/2023 38.1  % Final    Mono % 11/17/2023 6.6  % Final    Eos % 11/17/2023 3.9  % Final    Basophil % 11/17/2023 0.3  % Final    Lymph # 11/17/2023 1.27  0.6 - 4.6 x10(3)/mcL Final    Neut # 11/17/2023 1.70 (L)  2.1 - 9.2 x10(3)/mcL Final    Mono # 11/17/2023 0.22  0.1 - 1.3 x10(3)/mcL Final    Eos # 11/17/2023 0.13  0 - 0.9 x10(3)/mcL Final    Baso # 11/17/2023 0.01  <=0.2 x10(3)/mcL Final    IG# 11/17/2023 0.00  0 - 0.04 x10(3)/mcL Final    IG% 11/17/2023 0.0  % Final         Imaging:   Assessment:     No diagnosis found.        Plan:   Will obtain hepatitis panel, HIV copper, ERLIN, ESR, CRP, Complete abdominal US, path review of peripheral smear. Already had a normal B12 and folate. Will give her a 40 mg dose of prednisone and check a CBC next day to see if she demarginates.   History of iron deficiency due to heavy periods secondary to fibroids. Has received IV iron in 2022 and 2021, will repeat iron studies. She is currently on 85 mg of elemental iron a day.   Problem List Items Addressed This Visit    None            <0.4  <7 U/mL Final      <7:   Negative  7-10: Equivocal  >10:  Positive    In the case of equivocal results, it is recommended to retest the patient after 8-12 weeks    Centromere Ab Quant 11/17/2023 <0.4  <7 U/mL Final      <7:   Negative  7-10: Equivocal  >10:  Positive    In the case of equivocal results, it is recommended to retest the patient after 8-12 weeks    SCL-70S Ab Quant 11/17/2023 <0.6  <7 U/mL Final      <7:   Negative  7-10: Equivocal  >10:  Positive    In the case of equivocal results, it is recommended to retest the patient after 8-12 weeks    SALOMÓN-1 Ab Quant 11/17/2023 <0.3  <7 U/mL Final      <7:   Negative  7-10: Equivocal  >10:  Positive    In the case of equivocal results, it is recommended to retest the patient after 8-12 weeks    Rodriguez DP IgG Quant 11/17/2023 1.1  <7 U/mL Final      <7:   Negative  7-10: Equivocal  >10:  Positive    In the case of equivocal results, it is recommended to retest the patient after 8-12 weeks    HIV 11/17/2023 Nonreactive  Nonreactive Final    Copper 11/17/2023 105  77 - 206 mcg/dL Final       -------------------ADDITIONAL INFORMATION-------------------  This test was developed and its performance characteristics   determined by [facility] in a manner consistent with CLIA   requirements. This test has not been cleared or approved by   the [place]. Food and Drug Administration.     Test Performed by:  [facility]  [address] [place] [address]  : Landon Slater M.D. Ph.D.; CLIA# [id number]    WBC 11/17/2023 3.33 (L)  4.50 - 11.50 x10(3)/mcL Final    RBC 11/17/2023 4.03 (L)  4.20 - 5.40 x10(6)/mcL Final    Hgb 11/17/2023 11.0 (L)  12.0 - 16.0 g/dL Final    Hct 11/17/2023 35.1 (L)  37.0 - 47.0 % Final    MCV 11/17/2023 87.1  80.0 - 94.0 fL Final    MCH 11/17/2023 27.3  27.0 - 31.0 pg Final    MCHC 11/17/2023 31.3 (L)  33.0 - 36.0 g/dL Final    RDW 11/17/2023 14.0  11.5 - 17.0 % Final    Platelet  11/17/2023 256  130 - 400 x10(3)/mcL Final    MPV 11/17/2023 10.4  7.4 - 10.4 fL Final    Neut % 11/17/2023 51.1  % Final    Lymph % 11/17/2023 38.1  % Final    Mono % 11/17/2023 6.6  % Final    Eos % 11/17/2023 3.9  % Final    Basophil % 11/17/2023 0.3  % Final    Lymph # 11/17/2023 1.27  0.6 - 4.6 x10(3)/mcL Final    Neut # 11/17/2023 1.70 (L)  2.1 - 9.2 x10(3)/mcL Final    Mono # 11/17/2023 0.22  0.1 - 1.3 x10(3)/mcL Final    Eos # 11/17/2023 0.13  0 - 0.9 x10(3)/mcL Final    Baso # 11/17/2023 0.01  <=0.2 x10(3)/mcL Final    IG# 11/17/2023 0.00  0 - 0.04 x10(3)/mcL Final    IG% 11/17/2023 0.0  % Final    IgG Level 11/17/2023 1,491.00  522.00 - 1,631.00 mg/dL Final    IgA Level 11/17/2023 262.0  65.0 - 421.0 mg/dL Final    IgM Level 11/17/2023 118.0  33.0 - 293.0 mg/dL Final    Kappa Free Light Chain 11/17/2023 1.73  0.3300 - 1.94 mg/dL Final    Lambda Free Light Chain 11/17/2023 1.76  0.5700 - 2.63 mg/dL Final    Kappa/Lambda FLC Ratio 11/17/2023 0.9830  0.2600 - 1.65 Final       Test Performed by:  [facility]  [address] [place] [address]  : Landon Slater M.D. Ph.D.; CLIA# [id number]    Protein Total 11/17/2023 6.8  6.4 - 8.3 gm/dL Final    Albumin 11/17/2023 3.5  3.5 - 5.0 g/dL Final    Globulin 11/17/2023 3.3  2.4 - 3.5 gm/dL Final    Albumin/Globulin Ratio 11/17/2023 1.1  1.1 - 2.0 ratio Final    Albumin, SPEP 11/17/2023 51.50   Final    Alpha 1 Glob % 11/17/2023 2.73   Final    Alpha 1 Glob 11/17/2023 0.19  gm/dl Final    Alpha 2 Glob% 11/17/2023 8.62   Final    Alpha 2 Glob 11/17/2023 0.59  gm/dl Final    Beta Glob % 11/17/2023 15.74   Final    Beta Glob 11/17/2023 1.07  gm/dl Final    Gamma Globulin % 11/17/2023 21.42   Final    Gamma Globulin 11/17/2023 1.46  gm/dl Final    M Drake % 11/17/2023    Final    Not observed     M Drake 11/17/2023    Final    Not observed    Pathology Review 11/17/2023 No serological evidence of recent or  past hepatitis A, B, or C infection.    Son Zuñiga M.D.    Final    Pathology Review 11/17/2023    Final                    Value:SPEP: Total protein level is within the reference range.     Serum protein electrophoresis shows normal distribution of the main protein fractions.     No monoclonal bands are detected. Serum protein electrophoresis shows no pathologic changes     Adam Ortiz M.D.         Imaging:   Assessment:     1. Leukopenia, unspecified type    2. Menorrhagia with regular cycle    3. Normocytic anemia            Plan:   Will obtain hepatitis panel, HIV copper, ERLIN, ESR, CRP, Complete abdominal US, path review of peripheral smear. Already had a normal B12 and folate. Will give her a 40 mg dose of prednisone and check a CBC next day to see if she demarginates.   History of iron deficiency due to heavy periods secondary to fibroids. Has received IV iron in 2022 and 2021, will repeat iron studies. She is currently on 85 mg of elemental iron a day.   Problem List Items Addressed This Visit    None  Visit Diagnoses       Leukopenia, unspecified type    -  Primary    Menorrhagia with regular cycle        Normocytic anemia

## 2023-11-22 ENCOUNTER — OFFICE VISIT (OUTPATIENT)
Dept: HEMATOLOGY/ONCOLOGY | Facility: CLINIC | Age: 48
End: 2023-11-22
Payer: COMMERCIAL

## 2023-11-22 DIAGNOSIS — D70.9 NEUTROPENIA, UNSPECIFIED TYPE: Primary | ICD-10-CM

## 2023-11-22 DIAGNOSIS — D70.8 BENIGN ETHNIC NEUTROPENIA: Primary | ICD-10-CM

## 2023-11-22 DIAGNOSIS — N92.0 MENORRHAGIA WITH REGULAR CYCLE: Primary | ICD-10-CM

## 2023-11-22 DIAGNOSIS — D50.0 IRON DEFICIENCY ANEMIA DUE TO CHRONIC BLOOD LOSS: ICD-10-CM

## 2023-11-22 PROCEDURE — 1159F PR MEDICATION LIST DOCUMENTED IN MEDICAL RECORD: ICD-10-PCS | Mod: CPTII,95,, | Performed by: INTERNAL MEDICINE

## 2023-11-22 PROCEDURE — 99214 PR OFFICE/OUTPT VISIT, EST, LEVL IV, 30-39 MIN: ICD-10-PCS | Mod: 95,,, | Performed by: INTERNAL MEDICINE

## 2023-11-22 PROCEDURE — 1159F MED LIST DOCD IN RCRD: CPT | Mod: CPTII,95,, | Performed by: INTERNAL MEDICINE

## 2023-11-22 PROCEDURE — 99214 OFFICE O/P EST MOD 30 MIN: CPT | Mod: 95,,, | Performed by: INTERNAL MEDICINE

## 2023-11-22 RX ORDER — UBIDECARENONE 75 MG
500 CAPSULE ORAL 2 TIMES DAILY
COMMUNITY

## 2023-11-22 NOTE — PROGRESS NOTES
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 She sees Dr. Schmidt and was referred to another GYN for hysterectomy who did not take her insurance. Patient has a long standing history of neutropenia dating back to her 20s. Documented Neutropenia in available labs dating back at least 7 years. Patient is actively taking oral iron 85mg of elemental iron, folate B12 and other vitamins. She has received IV iron infusions last year and the year before at [facility] area.  She does not have frequent infections.   LABS:    23 WBC 3.33, HGB 11.0, Iron 102, ferritin 33, sed Rate 19, CRP <1, quantitative immunoglobulins normal, free light chains normal, SPEP no M-spike, Hep and HIV nonreactive.   10/25/23 :  WBC 2.95, ANC 1.5, HGB 10.2, HCT 32.5, MCV 88, PLT 231K  10/2/23 WBC 2.88, HGB 11.14, HCT 34.8, MCV 90, GYE536, iron 84, iron sat 28, folate > 20, ferritin 41, B12 771, CMP WNL except total bili 1.4retic 1.55  22 WBC 3.4, ANC   21 WBC 4.8, ANC 2.21 WBC 3.8, AC 2.0  IMAGIN23 pelvic US:  Uterus: The uterus measures 8.0 x 5.3 x 8.8 cm with multiple (at least 3) benign-appearing intramural fibroids measuring as large as 4 cm in greatest diameter.  The endometrial stripe is thickened measuring 1.5 cm in AP thickness on transvaginal imaging.   Ovaries: The right ovary measures 3.5 x 2.3 x 2.4 cm and the left ovary measures 2.2 x 1.1 x 1.9 cm with benign-appearing follicles originating from both ovaries and 2 small (1.6 cm or less), moderately tender, benign-appearing, simple cysts noted originating from the  right ovary.  11/2023 abd US:  unremarkable.   Subjective:   Interval History: Ms. Reyes is a 48 y.o. female who returns for new evaluation and treatment of. neutropenia     Past Medical History:   Past Medical History:   Diagnosis Date    Anemia     Benign neoplasm of nerve sheath     Cervical pain     Lumbar radiculopathy     Muscle spasticity     Muscle wasting and atrophy, not elsewhere classified, other site     Thoracic back pain    Uterine fibroids    Past Surgical HIstory:   Past Surgical History:   Procedure Laterality Date    C6-T1 LAMINECTOMIES W/ EXCISION OF RIGHT C8 SCHWANNOMA  05/01/2020    Dr. Orlando       Family History:   Family History   Problem Relation Age of Onset    Heart disease Mother     Hyperlipidemia Mother     Kidney disease Mother     Peripheral vascular disease Mother     Diabetes Mother     Stroke Father     Heart disease Father     Hyperlipidemia Father     Breast cancer Neg Hx     Colon cancer Neg Hx     Ovarian cancer Neg Hx     Uterine cancer Neg Hx     Cervical cancer Neg Hx        Social History:  reports that she has never smoked. She has never been exposed to tobacco smoke. She has never used smokeless tobacco. She reports current alcohol use. She reports that she does not use drugs.    Allergies:  Review of patient's allergies indicates:  No Known Allergies    Medications:  Current Outpatient Medications   Medication Sig Dispense Refill    ascorbic acid, vitamin C, (VITAMIN C) 1000 MG tablet Take 1 tablet by mouth once daily.      cholecalciferol, vitamin D3, 125 mcg (5,000 unit) capsule Take 1 capsule by mouth once daily.      co-enzyme Q-10 30 mg capsule Take 30 mg by mouth 3 (three) times daily.      cyanocobalamin 500 MCG tablet Take 500 mcg by mouth 2 (two) times a day.      ferrous sulfate 325 (65 FE) MG EC tablet Take 325 mg by mouth once daily.      folic acid (FOLVITE) 800 MCG Tab Take 800 mcg by mouth once daily.      ibuprofen (ADVIL,MOTRIN) 800 MG tablet 1 tablet  with food or milk as needed Orally every 8 hrs      vitamin A 33945 UNIT capsule Take 10,000 Units by mouth once daily.      vitamin E 400 UNIT capsule Take 400 Units by mouth once daily.      zinc acetate 50 mg (zinc) Cap Take 1 capsule by mouth once daily.      cyanocobalamin 1,000 mcg/mL injection INJECT 1ML INTO THE MUSCLE ONCE WEEKLY FOR 12 WEEKS      desogestreL-ethinyl estradioL (ISIBLOOM) 0.15-0.03 mg per tablet TAKE 1 TABLET BY MOUTH DAILY (Patient not taking: Reported on 11/22/2023) 84 tablet 3    predniSONE (DELTASONE) 20 MG tablet Take 1 tablet (20 mg total) by mouth once daily. (Patient not taking: Reported on 11/22/2023) 2 tablet 0     No current facility-administered medications for this visit.       Review of Systems   All other systems reviewed and are negative.      ECOG Performance Status:   ECOG SCORE             Objective:      Vitals:   There were no vitals filed for this visit.    BMI: There is no height or weight on file to calculate BMI.    Physical Exam  Constitutional:       Appearance: Normal appearance. She is normal weight.   HENT:      Head: Normocephalic and atraumatic.   Eyes:      Extraocular Movements: Extraocular movements intact.      Conjunctiva/sclera: Conjunctivae normal.   Cardiovascular:      Rate and Rhythm: Normal rate and regular rhythm.   Pulmonary:      Effort: Pulmonary effort is normal.      Breath sounds: Normal breath sounds.   Abdominal:      General: Abdomen is flat. Bowel sounds are normal.      Palpations: Abdomen is soft.   Musculoskeletal:         General: Normal range of motion.      Cervical back: Normal range of motion and neck supple.   Skin:     General: Skin is warm and dry.   Neurological:      General: No focal deficit present.      Mental Status: She is alert and oriented to person, place, and time.   Psychiatric:         Mood and Affect: Mood normal.         Behavior: Behavior normal.       Laboratory Data:  Lab Visit on 11/17/2023   Component Date  Value Ref Range Status    Iron Binding Capacity Unsaturated 11/17/2023 172  70 - 310 ug/dL Final    Iron Level 11/17/2023 102  50 - 170 ug/dL Final    Iron Binding Capacity Total 11/17/2023 274  250 - 450 ug/dL Final    Iron Saturation 11/17/2023 37  20 - 50 % Final    Ferritin Level 11/17/2023 33.94  4.63 - 204.00 ng/mL Final    Peripheral Smear Evaluation 11/17/2023 Absolute neutropenia may reflect infection, drug induce, autoimmune condition, toxin or idiopathic, among others.    Adam Ortiz M.D.   Final    C-Reactive Protein 11/17/2023 <1.00  <5.00 mg/L Final    Sed Rate 11/17/2023 19  0 - 20 mm/hr Final    Hepatitis A IgM 11/17/2023 Nonreactive  Nonreactive Final    Hepatitis B Core IgM 11/17/2023 Nonreactive  Nonreactive Final    Hepatitis B Surface Antigen 11/17/2023 Nonreactive  Nonreactive Final    Hep C Ab Interp 11/17/2023 Nonreactive  Nonreactive Final    ERLIN Screen 11/17/2023 Negative  Negative Final      In the case of equivocal results, it is recommended to retest the patient after 8-12 weeks    DSDNA Ab Quant 11/17/2023 <0.6  <10.0 IU/mL Final      <10:   Negative  10-15: Equivocal  >15:   Positive    In the case of equivocal results, it is recommended to retest the patient after 8-12 weeks    U1RNP Ab Quant 11/17/2023 0.8  <5 U/mL Final      <5:   Negative  5-10: Equivocal  >10:  Positive    In the case of equivocal results, it is recommended to retest the patient after 8-12 weeks    RNP70 Ab Quant 11/17/2023 1.1  <7 U/mL Final      <7:   Negative  7-10: Equivocal  >10:  Positive    In the case of equivocal results, it is recommended to retest the patient after 8-12 weeks    SSA(Ro) Ab Quant 11/17/2023 <0.4  <7 U/mL Final      <7:   Negative  7-10: Equivocal  >10:  Positive    In the case of equivocal results, it is recommended to retest the patient after 8-12 weeks    SSB(La) Ab Quant 11/17/2023 <0.4  <7 U/mL Final      <7:   Negative  7-10: Equivocal  >10:  Positive    In the case of equivocal  results, it is recommended to retest the patient after 8-12 weeks    Centromere Ab Quant 11/17/2023 <0.4  <7 U/mL Final      <7:   Negative  7-10: Equivocal  >10:  Positive    In the case of equivocal results, it is recommended to retest the patient after 8-12 weeks    SCL-70S Ab Quant 11/17/2023 <0.6  <7 U/mL Final      <7:   Negative  7-10: Equivocal  >10:  Positive    In the case of equivocal results, it is recommended to retest the patient after 8-12 weeks    SALOMÓN-1 Ab Quant 11/17/2023 <0.3  <7 U/mL Final      <7:   Negative  7-10: Equivocal  >10:  Positive    In the case of equivocal results, it is recommended to retest the patient after 8-12 weeks    Rodriguez DP IgG Quant 11/17/2023 1.1  <7 U/mL Final      <7:   Negative  7-10: Equivocal  >10:  Positive    In the case of equivocal results, it is recommended to retest the patient after 8-12 weeks    HIV 11/17/2023 Nonreactive  Nonreactive Final    Copper 11/17/2023 105  77 - 206 mcg/dL Final       -------------------ADDITIONAL INFORMATION-------------------  This test was developed and its performance characteristics   determined by [facility] in a manner consistent with CLIA   requirements. This test has not been cleared or approved by   the U.S. Food and Drug Administration.     Test Performed by:  [place] [address]  : Landon Slater M.D. Ph.D.; CLIA# [id number]    WBC 11/17/2023 3.33 (L)  4.50 - 11.50 x10(3)/mcL Final    RBC 11/17/2023 4.03 (L)  4.20 - 5.40 x10(6)/mcL Final    Hgb 11/17/2023 11.0 (L)  12.0 - 16.0 g/dL Final    Hct 11/17/2023 35.1 (L)  37.0 - 47.0 % Final    MCV 11/17/2023 87.1  80.0 - 94.0 fL Final    MCH 11/17/2023 27.3  27.0 - 31.0 pg Final    MCHC 11/17/2023 31.3 (L)  33.0 - 36.0 g/dL Final    RDW 11/17/2023 14.0  11.5 - 17.0 % Final    Platelet 11/17/2023 256  130 - 400 x10(3)/mcL Final    MPV 11/17/2023 10.4  7.4 - 10.4 fL Final    Neut %  11/17/2023 51.1  % Final    Lymph % 11/17/2023 38.1  % Final    Mono % 11/17/2023 6.6  % Final    Eos % 11/17/2023 3.9  % Final    Basophil % 11/17/2023 0.3  % Final    Lymph # 11/17/2023 1.27  0.6 - 4.6 x10(3)/mcL Final    Neut # 11/17/2023 1.70 (L)  2.1 - 9.2 x10(3)/mcL Final    Mono # 11/17/2023 0.22  0.1 - 1.3 x10(3)/mcL Final    Eos # 11/17/2023 0.13  0 - 0.9 x10(3)/mcL Final    Baso # 11/17/2023 0.01  <=0.2 x10(3)/mcL Final    IG# 11/17/2023 0.00  0 - 0.04 x10(3)/mcL Final    IG% 11/17/2023 0.0  % Final    IgG Level 11/17/2023 1,491.00  522.00 - 1,631.00 mg/dL Final    IgA Level 11/17/2023 262.0  65.0 - 421.0 mg/dL Final    IgM Level 11/17/2023 118.0  33.0 - 293.0 mg/dL Final    Kappa Free Light Chain 11/17/2023 1.73  0.3300 - 1.94 mg/dL Final    Lambda Free Light Chain 11/17/2023 1.76  0.5700 - 2.63 mg/dL Final    Kappa/Lambda FLC Ratio 11/17/2023 0.9830  0.2600 - 1.65 Final       Test Performed by:  [facility]  [address] [place] [address]  : Landon Slater M.D. Ph.D.; CLIA# [id number]    Pathology Review 11/17/2023    Final                    Value:SPEP: Total protein level is within the reference range.     Serum protein electrophoresis shows normal distribution of the main protein fractions.     No monoclonal bands are detected. Serum protein electrophoresis shows no pathologic changes     Adam Ortiz M.D.         Imaging:   Assessment:     1. Benign ethnic neutropenia    2. Iron deficiency anemia due to chronic blood loss            Plan:   Work up with hepatitis panel, HIV copper, ERLIN, ESR, CRP, Complete abdominal US, path review of peripheral smear and myeloma workup all normal. Already had a normal B12 and folate.   She was given  40 mg dose of prednisone and WBC demarginated and increased to 5.1   History of iron deficiency due to heavy periods secondary to fibroids. Has received IV iron in 2022 and 2021, repeat iron studies are  normal though ferritin is only 33 on on 85 mg of elemental iron a day. I will ask her to continue these. We are referring her to [facility] for consideration of hysterectomy of her large fibroid and menorrhagia requiring IV iron.   Problem List Items Addressed This Visit          Oncology    Iron deficiency anemia, unspecified     Other Visit Diagnoses       Benign ethnic neutropenia    -  Primary        No further work up required for neutropenia. Will trend her iron levels until she has a hysterectomy. RTC in 3 months with CBC and iron studies. .      Established Patient - Audio Only Telehealth Visit     The patient location is: home  The chief complaint leading to consultation is: lab results  Visit type: Virtual visit with audio only (telephone)  Total time spent with patient: 21       The reason for the audio only service rather than synchronous audio and video virtual visit was related to technical difficulties or patient preference/necessity.     Each patient to whom I provide medical services by telemedicine is:  (1) informed of the relationship between the physician and patient and the respective role of any other health care provider with respect to management of the patient; and (2) notified that they may decline to receive medical services by telemedicine and may withdraw from such care at any time. Patient verbally consented to receive this service via voice-only telephone call.       This service was not originating from a related E/M service provided within the previous 7 days nor will  to an E/M service or procedure within the next 24 hours or my soonest available appointment.  Prevailing standard of care was able to be met in this audio-only visit.

## 2023-12-01 ENCOUNTER — DOCUMENTATION ONLY (OUTPATIENT)
Dept: HEMATOLOGY/ONCOLOGY | Facility: CLINIC | Age: 48
End: 2023-12-01
Payer: COMMERCIAL

## 2023-12-06 NOTE — PROGRESS NOTES
Chief complaint:  Multidisciplinary Discussion (Desires discussion for Hyst; declined EMB )    History of Present Illness:  Efraín Reyes is a 48 y.o. female  presents for an endometrial biopsy secondary to menorrhagia and uterine fibroids. Having monthly cycles lasting 7 days with heavy flow. Denies dizziness or feeling lightheaded.      LMP: 23  Frequency: monthly  Cycle Length: 7 days   Flow: heavy  Intermenstrual Bleeding: Yes  Postcoital Bleeding: No  Dysmenorrhea: Yes moderte   Sexually Active: Yes   Dyspareunia: No  Contraception: None  H/o STI: TV  Last pap: 23, nml. Neg HPV,   H/o abnl pap: Yes , age 20's  Colposcopy: x2   Gardasil: 0/3   MM23 - Benign  H/o abnl MMG: yes, 30's- dense breast  Colonoscopy: n/a        Review of Systems:  General/Constitutional: Chills denies. Fatigue/weakness denies. Fever denies. Night sweats denies. Hot flashes denies  Gastrointestinal: Abdominal pain denies. Blood in stool denies.Constipation denies. Diarrhea denies. Heartburn denies. Nausea denies. Vomiting denies   Genitourinary: Incontinence denies. Blood in urine denies. Frequent urination denies. Urgency denies. Painful urination denies. Nocturia denies   Gynecologic: Irregular menses denies. Heavy bleeding denies. Painful menses denies. Vaginal discharge denies. Vaginal odor denies. Vaginal itching/Irritation denies. Vaginal lesion denies. Pelvic pain denies. Decreased libido denies. Vulvar lesion denies. Prolapse of genital organs denies. Painful intercourse denies. Postcoital bleeding denies   Psychiatric: Mood lability denies. Depressed mood denies. Suicidal thoughts denies. Anxiety denies. Overwhelmed denies. Appetite normal. Energy level normal     OB History    Para Term  AB Living   1 1 1 0 0 1   SAB IAB Ectopic Multiple Live Births   0 0 0 0 1      # 1 - Date: [birth year], Sex: Female, Weight: 3.515 kg (7 lb 12 oz), GA: None, Delivery: Vaginal, Spontaneous, Apgar1: None,  "Apgar5: None, Living: Living, Birth Comments: None        Physical Exam:  /74   Temp 97.2 °F (36.2 °C)   Ht 5' 7.99" (1.727 m)   Wt 97.2 kg (214 lb 4.6 oz)   LMP 11/20/2023   BMI 32.59 kg/m²       Chaperone present.     Constitutional: General appearance: healthy, well-nourished and well-developed   Psychiatric: Orientation to time, place and person. Normal mood and affect and active, alert   Abdomen: Auscultation/Inspection/Palpation: No tenderness or masses. Soft, nondistended    Female Genitalia:      Vulva: no masses, atrophy or lesions      Bladder/Urethra: no urethral discharge or mass, normal meatus, bladder non-distended.      Vagina: no tenderness, erythema, cystocele, rectocele, abnormal vaginal discharge, or vesicle(s) or ulcers                   Cervix: no discharge or cervical motion tenderness and grossly normal      Uterus: normal size and shape and midline, non-tender, and no uterine prolapse.      Adnexa/Parametria: no parametrial tenderness or mass, no adnexal tenderness or ovarian mass.       PROCEDURE:   Verbal consent was obtained.      UPT negative.       Speculum was placed in the vagina. Cervix was cleaned with 3 iodine. The anterior lip of the cervix was then grasped with a single tooth tenaculum. Endometrial Pipette was then advanced into the uterus. Suction was then applied to the endometrial Pipette curettage and was performed to 360 degrees. Pipette was then removed from the uterus and the specimen was then placed in specimen cup adequate tissue was confirmed. Tenaculum was then removed from the patient's cervix. Excellent hemostasis was achieved. All instruments removed from the patient's vagina.   The specimen was placed in formalyn and sent to Pathology for histology evaluation.The patient tolerated the procedure well.      Assessment/Plan:  1. Menometrorrhagia  2. Uterine leiomyoma, unspecified location   Educated  Reviewed all imaging, labs and pap/cultures with " patient    Patient desires definitive management in the form of a hysterectomy. Explained to patient need to sample endometrium to r/o precancerous and cancerous lesions prior to hysterectomy. Pt offered office EMB vs Hysteroscope D&C. Pt states understanding.     Pt desires to continue with EMB today  UPT negative  Pt tolerated well  RTC 2 weeks to go over results    Stressed importance of follow up  Does not desire surgery before the holidays      IMAGING  US pelvis  5/24/23  - Uterus:  8.0 x 5.3 x 8.8 cm; multiple (at least 3) benign-appearing intramural fibroids measuring as large as 4 cm  - ES: 1.5 cm   - ROV: 3.5 x 2.3 x 2.4 cm  - LOV: 2.2 x 1.1 x 1.9 cm; 2 small (1.6 cm or less), moderately tender, benig    US pelvis 11/29/21  - uterus: 9.5 x 6.1 x 8.4 cm; ill-defined, intramural fibroids measuring as large as 4.0 cm   - ES: 1.9 cm  - ROV: 4.3 x 1.6 x 2.6 cm; A 1.7 cm cyst with internal echogenic debris  - LOV: 2.2 x 1.2 x 2.5 cm    PATHOLOGY/CULTURES  Pap 5/11/23 NIL, neg HPV, neg gc/cz/tv/myco/urea    LABS  11/17/23  H&H: 11/35.1  Iron: 102  TIBC: 274  Ferritin: 33.94      POST ENDOMETRIAL BIOPSY COUNSELING:  Manage post biopsy cramping with NSAIDs or Tylenol.  Expect spotting or light bleeding for a few days.  Report bleeding heavier than a period, fever > 101.0 F, worsening pain or a foul smelling vaginal discharge  Pelvic rest    All questions were answered.

## 2023-12-07 ENCOUNTER — PROCEDURE VISIT (OUTPATIENT)
Dept: OBSTETRICS AND GYNECOLOGY | Facility: CLINIC | Age: 48
End: 2023-12-07
Payer: COMMERCIAL

## 2023-12-07 VITALS
DIASTOLIC BLOOD PRESSURE: 86 MMHG | BODY MASS INDEX: 32.48 KG/M2 | SYSTOLIC BLOOD PRESSURE: 126 MMHG | WEIGHT: 214.31 LBS | RESPIRATION RATE: 20 BRPM | HEART RATE: 84 BPM | TEMPERATURE: 97 F | HEIGHT: 68 IN

## 2023-12-07 DIAGNOSIS — N92.1 MENOMETRORRHAGIA: Primary | ICD-10-CM

## 2023-12-07 DIAGNOSIS — D25.9 UTERINE LEIOMYOMA, UNSPECIFIED LOCATION: ICD-10-CM

## 2023-12-07 LAB
B-HCG UR QL: NEGATIVE
CTP QC/QA: YES

## 2023-12-07 PROCEDURE — 81025 URINE PREGNANCY TEST: CPT | Mod: ,,, | Performed by: OBSTETRICS & GYNECOLOGY

## 2023-12-07 PROCEDURE — 58100 PR BIOPSY OF UTERUS LINING: ICD-10-PCS | Mod: ,,, | Performed by: OBSTETRICS & GYNECOLOGY

## 2023-12-07 PROCEDURE — 81025 POCT URINE PREGNANCY: ICD-10-PCS | Mod: ,,, | Performed by: OBSTETRICS & GYNECOLOGY

## 2023-12-07 PROCEDURE — 58100 BIOPSY OF UTERUS LINING: CPT | Mod: ,,, | Performed by: OBSTETRICS & GYNECOLOGY

## 2023-12-07 RX ORDER — ESZOPICLONE 3 MG/1
3 TABLET, FILM COATED ORAL NIGHTLY PRN
COMMUNITY
Start: 2023-10-24

## 2023-12-07 RX ORDER — MELOXICAM 7.5 MG/1
7.5 TABLET ORAL DAILY
COMMUNITY
Start: 2023-11-06

## 2023-12-07 RX ORDER — CYCLOBENZAPRINE HCL 10 MG
10 TABLET ORAL 2 TIMES DAILY PRN
COMMUNITY
Start: 2023-12-03

## 2023-12-11 LAB — PSYCHE PATHOLOGY RESULT: NORMAL

## 2023-12-27 NOTE — PROGRESS NOTES
Chief Complaint:  Follow-up (Pt presents for EMB results. )    History of Present Illness:  Patient is a 48 y.o.  presents to discuss pathology results from recent EMB secondary to menorrhagia and uterine fibroids.Having monthly cycles lasting 7 days with heavy flow. Denies dizziness or feeling lightheaded.       LMP: 2023  Frequency: monthly  Cycle Length: 7 days  Flow: heavy  Intermenstrual bleeding: Yes  Postcoital bleeding: No  Dysmenorrhea: Yes moderte   Sexually active: Yes   Dyspareunia: No  Contraception: None  H/o STI: TV  Last pap: 23, NIL neg HPV  H/o abnl pap: yes, age 20's  Colposcopy: x2   Gardasil: 0/3  MM23 Birads 2  H/o abnl MMG: yes, 30's- dense breast  Colonoscopy: n/a      Review of systems:  General/Constitutional: Chills denies. Fatigue/weakness denies. Fever denies. Night sweats denies. Hot flashes denies  Breast: Dimpling denies. Breast mass denies. Breast pain/tenderness denies. Nipple discharge denies. Puckering denies.  Gastrointestinal: Abdominal pain denies. Blood in stool denies. Constipation denies. Diarrhea denies. Heartburn denies. Nausea denies. Vomiting denies   Genitourinary: Incontinence denies. Blood in urine denies. Frequent urination denies. Urgency denies. Painful urination denies. Nocturia denies   Gynecologic: Irregular menses +. Heavy bleeding +. Painful menses +. Vaginal discharge denies. Vaginal odor denies. Vaginal itching/Irritation denies. Vaginal lesion denies.  Pelvic pain denies. Decreased libido denies. Vulvar lesion denies. Prolapse of genital organs denies. Painful intercourse denies. Postcoital bleeding denies   Psychiatric: Mood lability denies. Depressed mood denies. Suicidal thoughts denies. Anxiety denies. Overwhelmed denies. Appetite normal. Energy level normal.     OB History    Para Term  AB Living   1 1 1 0 0 1   SAB IAB Ectopic Multiple Live Births   0 0 0 0 1      # 1 - Date: [birth year], Sex: Female, Weight: 3.515  "kg (7 lb 12 oz), GA: None, Delivery: Vaginal, Spontaneous, Apgar1: None, Apgar5: None, Living: Living, Birth Comments: None      Past Medical History:   Diagnosis Date    Anemia     Benign neoplasm of nerve sheath     Cervical pain     Lumbar radiculopathy     Muscle spasticity     Muscle wasting and atrophy, not elsewhere classified, other site     Neutropenia, unspecified type     Thoracic back pain      Past Surgical History:   Procedure Laterality Date    C6-T1 LAMINECTOMIES W/ EXCISION OF RIGHT C8 SCHWANNOMA  05/01/2020    Dr. Orlando    ENDOMETRIAL BIOPSY  2023        Current Outpatient Medications:     ascorbic acid, vitamin C, (VITAMIN C) 1000 MG tablet, Take 1 tablet by mouth once daily., Disp: , Rfl:     cholecalciferol, vitamin D3, 125 mcg (5,000 unit) capsule, Take 1 capsule by mouth once daily., Disp: , Rfl:     co-enzyme Q-10 30 mg capsule, Take 30 mg by mouth 3 (three) times daily., Disp: , Rfl:     cyanocobalamin 500 MCG tablet, Take 500 mcg by mouth 2 (two) times a day., Disp: , Rfl:     cyclobenzaprine (FLEXERIL) 10 MG tablet, Take 10 mg by mouth 2 (two) times daily as needed., Disp: , Rfl:     eszopiclone (LUNESTA) 3 mg Tab, Take 3 mg by mouth nightly as needed., Disp: , Rfl:     ferrous sulfate 325 (65 FE) MG EC tablet, Take 325 mg by mouth once daily., Disp: , Rfl:     folic acid (FOLVITE) 800 MCG Tab, Take 800 mcg by mouth once daily., Disp: , Rfl:     ibuprofen (ADVIL,MOTRIN) 800 MG tablet, 1 tablet with food or milk as needed Orally every 8 hrs, Disp: , Rfl:     meloxicam (MOBIC) 7.5 MG tablet, , Disp: , Rfl:     vitamin A 75534 UNIT capsule, Take 10,000 Units by mouth once daily., Disp: , Rfl:     vitamin E 400 UNIT capsule, Take 400 Units by mouth once daily., Disp: , Rfl:     zinc acetate 50 mg (zinc) Cap, Take 1 capsule by mouth once daily., Disp: , Rfl:       Physical Exam:  /82 (BP Location: Right arm, Patient Position: Sitting, BP Method: Medium (Manual))   Ht 5' 7" (1.702 m)   " Wt 98.4 kg (217 lb)   LMP 12/19/2023 (Exact Date)   BMI 33.99 kg/m²     Constitutional: General appearance: healthy, well-nourished and well-developed   Psychiatric:  Orientation to time, place and person. Normal mood and affect and active, alert       Assessment/Plan:  1. Menometrorrhagia, Uterine leiomyoma  Educated  Reviewed pathology with patient, see below.    Educated on medical management verses surgical management  Patient strongly desires definitive mngt in the form of a hysterectomy  RTC for preadmit with Dr. Valle. Pt states understanding.   Bleeding/ER prec    Repeat CBC today      IMAGING  US pelvis  5/24/23  - Uterus:  8.0 x 5.3 x 8.8 cm; multiple (at least 3) benign-appearing intramural fibroids measuring as large as 4 cm  - ES: 1.5 cm   - ROV: 3.5 x 2.3 x 2.4 cm  - LOV: 2.2 x 1.1 x 1.9 cm; 2 small (1.6 cm or less), moderately tender, benig     US pelvis 11/29/21  - uterus: 9.5 x 6.1 x 8.4 cm; ill-defined, intramural fibroids measuring as large as 4.0 cm   - ES: 1.9 cm  - ROV: 4.3 x 1.6 x 2.6 cm; A 1.7 cm cyst with internal echogenic debris  - LOV: 2.2 x 1.2 x 2.5 cm     PATHOLOGY/CULTURES  EMB 12/7/23 EMB, proliferative phase endometrium and no hyperplasia or carcinoma  Pap 5/11/23 NIL, neg HPV, neg gc/cz/tv/myco/urea     LABS  11/17/23  H&H: 11/35.1  Iron: 102  TIBC: 274  Ferritin: 33.94

## 2023-12-28 ENCOUNTER — OFFICE VISIT (OUTPATIENT)
Dept: OBSTETRICS AND GYNECOLOGY | Facility: CLINIC | Age: 48
End: 2023-12-28
Payer: COMMERCIAL

## 2023-12-28 ENCOUNTER — LAB VISIT (OUTPATIENT)
Dept: LAB | Facility: HOSPITAL | Age: 48
End: 2023-12-28
Attending: INTERNAL MEDICINE
Payer: COMMERCIAL

## 2023-12-28 VITALS
WEIGHT: 217 LBS | BODY MASS INDEX: 34.06 KG/M2 | SYSTOLIC BLOOD PRESSURE: 126 MMHG | HEIGHT: 67 IN | DIASTOLIC BLOOD PRESSURE: 82 MMHG

## 2023-12-28 DIAGNOSIS — D64.9 ANEMIA, UNSPECIFIED TYPE: Primary | ICD-10-CM

## 2023-12-28 DIAGNOSIS — D25.9 UTERINE LEIOMYOMA, UNSPECIFIED LOCATION: ICD-10-CM

## 2023-12-28 DIAGNOSIS — N92.1 MENOMETRORRHAGIA: Primary | ICD-10-CM

## 2023-12-28 DIAGNOSIS — N92.1 MENOMETRORRHAGIA: ICD-10-CM

## 2023-12-28 LAB
ERYTHROCYTE [DISTWIDTH] IN BLOOD BY AUTOMATED COUNT: 14.1 % (ref 11–14.5)
HCT VFR BLD AUTO: 31.7 % (ref 36–48)
HGB BLD-MCNC: 10.5 G/DL (ref 11.8–16)
MCH RBC QN AUTO: 28.5 PG (ref 27–34)
MCHC RBC AUTO-ENTMCNC: 33.1 G/DL (ref 31–37)
MCV RBC AUTO: 85.9 FL (ref 79–99)
NRBC BLD AUTO-RTO: 0 %
PLATELET # BLD AUTO: 263 X10(3)/MCL (ref 140–371)
PMV BLD AUTO: 10.3 FL (ref 9.4–12.4)
RBC # BLD AUTO: 3.69 X10(6)/MCL (ref 4–5.1)
WBC # SPEC AUTO: 4.06 X10(3)/MCL (ref 4–11.5)

## 2023-12-28 PROCEDURE — 3074F SYST BP LT 130 MM HG: CPT | Mod: CPTII,,, | Performed by: OBSTETRICS & GYNECOLOGY

## 2023-12-28 PROCEDURE — 3074F PR MOST RECENT SYSTOLIC BLOOD PRESSURE < 130 MM HG: ICD-10-PCS | Mod: CPTII,,, | Performed by: OBSTETRICS & GYNECOLOGY

## 2023-12-28 PROCEDURE — 1159F PR MEDICATION LIST DOCUMENTED IN MEDICAL RECORD: ICD-10-PCS | Mod: CPTII,,, | Performed by: OBSTETRICS & GYNECOLOGY

## 2023-12-28 PROCEDURE — 3008F PR BODY MASS INDEX (BMI) DOCUMENTED: ICD-10-PCS | Mod: CPTII,,, | Performed by: OBSTETRICS & GYNECOLOGY

## 2023-12-28 PROCEDURE — 3008F BODY MASS INDEX DOCD: CPT | Mod: CPTII,,, | Performed by: OBSTETRICS & GYNECOLOGY

## 2023-12-28 PROCEDURE — 36415 COLL VENOUS BLD VENIPUNCTURE: CPT

## 2023-12-28 PROCEDURE — 85027 COMPLETE CBC AUTOMATED: CPT

## 2023-12-28 PROCEDURE — 99214 PR OFFICE/OUTPT VISIT, EST, LEVL IV, 30-39 MIN: ICD-10-PCS | Mod: ,,, | Performed by: OBSTETRICS & GYNECOLOGY

## 2023-12-28 PROCEDURE — 3079F DIAST BP 80-89 MM HG: CPT | Mod: CPTII,,, | Performed by: OBSTETRICS & GYNECOLOGY

## 2023-12-28 PROCEDURE — 1159F MED LIST DOCD IN RCRD: CPT | Mod: CPTII,,, | Performed by: OBSTETRICS & GYNECOLOGY

## 2023-12-28 PROCEDURE — 3079F PR MOST RECENT DIASTOLIC BLOOD PRESSURE 80-89 MM HG: ICD-10-PCS | Mod: CPTII,,, | Performed by: OBSTETRICS & GYNECOLOGY

## 2023-12-28 PROCEDURE — 99214 OFFICE O/P EST MOD 30 MIN: CPT | Mod: ,,, | Performed by: OBSTETRICS & GYNECOLOGY

## 2023-12-28 RX ORDER — IRON,CARBONYL/ASCORBIC ACID 100-250 MG
1 TABLET ORAL 2 TIMES DAILY
Qty: 60 TABLET | Refills: 3 | Status: SHIPPED | OUTPATIENT
Start: 2023-12-28 | End: 2024-04-26

## 2023-12-28 NOTE — PROGRESS NOTES
Pt notified and educated on lab results, anemia. Advised pt that I will be sending her out some ICAR that she will take bid. Pt verbalized understanding. Medication sent to pt's pharmacy.

## 2024-01-09 ENCOUNTER — OFFICE VISIT (OUTPATIENT)
Dept: OBSTETRICS AND GYNECOLOGY | Facility: CLINIC | Age: 49
End: 2024-01-09
Payer: COMMERCIAL

## 2024-01-09 VITALS
TEMPERATURE: 97 F | SYSTOLIC BLOOD PRESSURE: 136 MMHG | WEIGHT: 215.38 LBS | DIASTOLIC BLOOD PRESSURE: 82 MMHG | BODY MASS INDEX: 33.73 KG/M2

## 2024-01-09 DIAGNOSIS — N92.0 MENORRHAGIA WITH REGULAR CYCLE: Primary | ICD-10-CM

## 2024-01-09 DIAGNOSIS — D21.9 FIBROIDS: ICD-10-CM

## 2024-01-09 DIAGNOSIS — D50.0 IRON DEFICIENCY ANEMIA DUE TO CHRONIC BLOOD LOSS: ICD-10-CM

## 2024-01-09 PROCEDURE — 99214 OFFICE O/P EST MOD 30 MIN: CPT | Mod: ,,, | Performed by: OBSTETRICS & GYNECOLOGY

## 2024-01-09 PROCEDURE — 3075F SYST BP GE 130 - 139MM HG: CPT | Mod: CPTII,,, | Performed by: OBSTETRICS & GYNECOLOGY

## 2024-01-09 PROCEDURE — 3079F DIAST BP 80-89 MM HG: CPT | Mod: CPTII,,, | Performed by: OBSTETRICS & GYNECOLOGY

## 2024-01-09 PROCEDURE — 1159F MED LIST DOCD IN RCRD: CPT | Mod: CPTII,,, | Performed by: OBSTETRICS & GYNECOLOGY

## 2024-01-09 PROCEDURE — 3008F BODY MASS INDEX DOCD: CPT | Mod: CPTII,,, | Performed by: OBSTETRICS & GYNECOLOGY

## 2024-01-09 RX ORDER — MUPIROCIN 20 MG/G
OINTMENT TOPICAL
Status: CANCELLED | OUTPATIENT
Start: 2024-01-09

## 2024-01-09 RX ORDER — SODIUM CHLORIDE 9 MG/ML
INJECTION, SOLUTION INTRAVENOUS CONTINUOUS
Status: CANCELLED | OUTPATIENT
Start: 2024-01-09

## 2024-01-09 RX ORDER — FAMOTIDINE 20 MG/1
20 TABLET, FILM COATED ORAL
Status: CANCELLED | OUTPATIENT
Start: 2024-01-09

## 2024-01-09 RX ORDER — CITALOPRAM 10 MG/1
10 TABLET, FILM COATED ORAL DAILY
COMMUNITY
Start: 2024-01-08

## 2024-01-09 RX ORDER — CEFAZOLIN SODIUM 2 G/50ML
2 SOLUTION INTRAVENOUS
Status: CANCELLED | OUTPATIENT
Start: 2024-01-09

## 2024-01-09 NOTE — PROGRESS NOTES
History & Physical    SUBJECTIVE:     History of Present Illness:  Patient is a 48 y.o.  is a patient of Dr Schmidt's in today to discuss hysterectomy due to menorrhagia to anemia and uterine fibroids. S/p EMB 23 with benign pathology. Reports menses q month, lasting 7 days, heavy in flow, moderate dysmenorrhea. Reports positive intermenstrual bleeding. Soils her clothes and changes pads every 1-2 hours.  She has recently been seeing a hematologist for neutropenia.  Hematologist believes she needs a hysterectomy as well.      LMP: 2023  Frequency: monthly  Cycle Length: 7 days  Flow: heavy  Intermenstrual bleeding: Yes  Postcoital bleeding: No  Dysmenorrhea: Yes moderte   Sexually active: Yes   Dyspareunia: No  Contraception: None  H/o STI: TV  Last pap: 23, NIL neg HPV  H/o abnl pap: yes, age 20's  Colposcopy: x2   Gardasil: 0/3  MM23 Birads 2  H/o abnl MMG: yes, 30's- dense breast  Colonoscopy: n/a        Per Dr Schmidt previous note 23:  History of Present Illness:  Patient is a 48 y.o.  presents to discuss pathology results from recent EMB secondary to menorrhagia and uterine fibroids.Having monthly cycles lasting 7 days with heavy flow. Denies dizziness or feeling lightheaded.     IMAGING  US pelvis  23  - Uterus:  8.0 x 5.3 x 8.8 cm; multiple (at least 3) benign-appearing intramural fibroids measuring as large as 4 cm  - ES: 1.5 cm   - ROV: 3.5 x 2.3 x 2.4 cm  - LOV: 2.2 x 1.1 x 1.9 cm; 2 small (1.6 cm or less), moderately tender, benig     US pelvis 21  - uterus: 9.5 x 6.1 x 8.4 cm; ill-defined, intramural fibroids measuring as large as 4.0 cm   - ES: 1.9 cm  - ROV: 4.3 x 1.6 x 2.6 cm; A 1.7 cm cyst with internal echogenic debris  - LOV: 2.2 x 1.2 x 2.5 cm     PATHOLOGY/CULTURES  EMB 23 EMB, proliferative phase endometrium and no hyperplasia or carcinoma  Pap 23 NIL, neg HPV, neg gc/cz/tv/myco/urea     LABS  23  H&H: .1  Iron: 102  TIBC:  274  Ferritin: 33.94     23  - Pap: Negative cytology, neg HPV, neg gc/cz/tv  - Cultures: neg nyco/urea  23:  History of Present Illness:  Patient is a 47 y.o.  presents to discuss recent pelvic imaging secondary to menometrorrhagia and uterine fibroids.     23:  Chief Complaint:   Well Woman (Annual Gyn Exam.  LMP: 23. Taking Desogen~Needs Refills.  +Uterine Fibroids.  /)      History of Present Illness:  Efraín Reyes is a 47 y.o. year old No obstetric history on file. here for her annual exam. Currently using desogen for birth control. Patient has monthly cycles with normal flow lasting 5-6 days. Admits to spotting a couple weeks following cycle. Denies any irregular menstrual bleeding.       Chief Complaint   Patient presents with    Multidisciplinary Discussion     Possible Hysterectomy       Review of patient's allergies indicates:  No Known Allergies    Current Outpatient Medications   Medication Sig Dispense Refill    ascorbic acid, vitamin C, (VITAMIN C) 1000 MG tablet Take 1 tablet by mouth once daily.      CELEXA 10 mg tablet Take 10 mg by mouth.      cholecalciferol, vitamin D3, 125 mcg (5,000 unit) capsule Take 1 capsule by mouth once daily.      co-enzyme Q-10 30 mg capsule Take 30 mg by mouth 3 (three) times daily.      cyanocobalamin 500 MCG tablet Take 500 mcg by mouth 2 (two) times a day.      cyclobenzaprine (FLEXERIL) 10 MG tablet Take 10 mg by mouth 2 (two) times daily as needed.      eszopiclone (LUNESTA) 3 mg Tab Take 3 mg by mouth nightly as needed.      ferrous sulfate 325 (65 FE) MG EC tablet Take 325 mg by mouth once daily.      folic acid (FOLVITE) 800 MCG Tab Take 800 mcg by mouth once daily.      ibuprofen (ADVIL,MOTRIN) 800 MG tablet 1 tablet with food or milk as needed Orally every 8 hrs      iron-vitamin C 100-250 mg, ICAR-C, (ICAR-C) 100-250 mg Tab Take 1 tablet by mouth 2 (two) times a day. 60 tablet 3    meloxicam (MOBIC) 7.5 MG tablet       vitamin A  42948 UNIT capsule Take 10,000 Units by mouth once daily.      vitamin E 400 UNIT capsule Take 400 Units by mouth once daily.      zinc acetate 50 mg (zinc) Cap Take 1 capsule by mouth once daily.       No current facility-administered medications for this visit.       Past Medical History:   Diagnosis Date    Anemia     Benign neoplasm of nerve sheath     Cervical pain     Lumbar radiculopathy     Muscle spasticity     Muscle wasting and atrophy, not elsewhere classified, other site     Neutropenia, unspecified type     Thoracic back pain      Past Surgical History:   Procedure Laterality Date    C6-T1 LAMINECTOMIES W/ EXCISION OF RIGHT C8 SCHWANNOMA  05/01/2020    Dr. Orlando    ENDOMETRIAL BIOPSY  2023     Family History   Problem Relation Age of Onset    Heart disease Mother     Hyperlipidemia Mother     Kidney disease Mother     Peripheral vascular disease Mother     Diabetes Mother     Stroke Father     Heart disease Father     Hyperlipidemia Father     Breast cancer Neg Hx     Colon cancer Neg Hx     Ovarian cancer Neg Hx     Uterine cancer Neg Hx     Cervical cancer Neg Hx      Social History     Tobacco Use    Smoking status: Never     Passive exposure: Never    Smokeless tobacco: Never   Substance Use Topics    Alcohol use: Yes     Comment: occasional    Drug use: Never        Review of Systems:  Review of Systems  Constitutional:  Negative for chills and fever.   Gastrointestinal:  Negative for abdominal pain, constipation and diarrhea.   Genitourinary:  Positive for dysmenorrhea, menorrhagia and menstrual problem. Negative for bladder incontinence, decreased libido, dyspareunia, dysuria, flank pain, frequency, genital sores, hematuria, hot flashes, pelvic pain, urgency, vaginal bleeding, vaginal discharge, vaginal pain, urinary incontinence, postcoital bleeding, postmenopausal bleeding, vaginal dryness and vaginal odor.   OBJECTIVE:     Vital Signs (Most Recent)  Temp: 97 °F (36.1 °C) (01/09/24 0958)  BP:  136/82 (01/09/24 0958)     97.7 kg (215 lb 6.2 oz)     Physical Exam:  Physical Exam  deferred    ASSESSMENT/PLAN:     1. Menorrhagia with regular cycle  - Case Request Operating Room: HYSTERECTOMY,TOTAL,LAPAROSCOPIC,WITH SALPINGO-OOPHORECTOMY  - Full code; Standing  - Vital signs; Standing  - Insert peripheral IV; Standing  - Pascal to Gravity; Standing  - POCT glucose; Standing  - Notify physician if BS > 180 for hysterectomy patients; Standing  - Chlorhexidine (CHG) 2% Wipes; Standing  - Notify Physician/Vital Signs Parameters Urine output less than 0.5mL/kg/hr (with indwelling catheter) or 30 mL/hr (without indwelling catheter) or blood glucose greater than 200 mg/dL; Standing  - Notify physician; Standing  - Notify Physician - Potential Need of Opioid Reversal; Standing  - Diet NPO; Standing  - Chlorohexidine Gluconate Bath; Standing  - Place in Outpatient; Standing  - Place sequential compression device; Standing  - Comprehensive metabolic panel; Standing  - CBC auto differential; Standing  - Pregnancy, urine rapid; Standing  - Urinalysis, Reflex to Urine Culture; Standing  - Type & Screen Pre Op; Standing    2. Fibroids  - Case Request Operating Room: HYSTERECTOMY,TOTAL,LAPAROSCOPIC,WITH SALPINGO-OOPHORECTOMY  - Full code; Standing  - Vital signs; Standing  - Insert peripheral IV; Standing  - Pascal to Gravity; Standing  - POCT glucose; Standing  - Notify physician if BS > 180 for hysterectomy patients; Standing  - Chlorhexidine (CHG) 2% Wipes; Standing  - Notify Physician/Vital Signs Parameters Urine output less than 0.5mL/kg/hr (with indwelling catheter) or 30 mL/hr (without indwelling catheter) or blood glucose greater than 200 mg/dL; Standing  - Notify physician; Standing  - Notify Physician - Potential Need of Opioid Reversal; Standing  - Diet NPO; Standing  - Chlorohexidine Gluconate Bath; Standing  - Place in Outpatient; Standing  - Place sequential compression device; Standing  - Comprehensive metabolic  panel; Standing  - CBC auto differential; Standing  - Pregnancy, urine rapid; Standing  - Urinalysis, Reflex to Urine Culture; Standing  - Type & Screen Pre Op; Standing    3. Iron deficiency anemia due to chronic blood loss  - Case Request Operating Room: HYSTERECTOMY,TOTAL,LAPAROSCOPIC,WITH SALPINGO-OOPHORECTOMY  - Full code; Standing  - Vital signs; Standing  - Insert peripheral IV; Standing  - Pascal to Gravity; Standing  - POCT glucose; Standing  - Notify physician if BS > 180 for hysterectomy patients; Standing  - Chlorhexidine (CHG) 2% Wipes; Standing  - Notify Physician/Vital Signs Parameters Urine output less than 0.5mL/kg/hr (with indwelling catheter) or 30 mL/hr (without indwelling catheter) or blood glucose greater than 200 mg/dL; Standing  - Notify physician; Standing  - Notify Physician - Potential Need of Opioid Reversal; Standing  - Diet NPO; Standing  - Chlorohexidine Gluconate Bath; Standing  - Place in Outpatient; Standing  - Place sequential compression device; Standing  - Comprehensive metabolic panel; Standing  - CBC auto differential; Standing  - Pregnancy, urine rapid; Standing  - Urinalysis, Reflex to Urine Culture; Standing  - Type & Screen Pre Op; Standing    4. BMI 33.0-33.9,adult  - Case Request Operating Room: HYSTERECTOMY,TOTAL,LAPAROSCOPIC,WITH SALPINGO-OOPHORECTOMY  - Full code; Standing  - Vital signs; Standing  - Insert peripheral IV; Standing  - Pascal to Gravity; Standing  - POCT glucose; Standing  - Notify physician if BS > 180 for hysterectomy patients; Standing  - Chlorhexidine (CHG) 2% Wipes; Standing  - Notify Physician/Vital Signs Parameters Urine output less than 0.5mL/kg/hr (with indwelling catheter) or 30 mL/hr (without indwelling catheter) or blood glucose greater than 200 mg/dL; Standing  - Notify physician; Standing  - Notify Physician - Potential Need of Opioid Reversal; Standing  - Diet NPO; Standing  - Chlorohexidine Gluconate Bath; Standing  - Place in Outpatient;  Standing  - Place sequential compression device; Standing  - Comprehensive metabolic panel; Standing  - CBC auto differential; Standing  - Pregnancy, urine rapid; Standing  - Urinalysis, Reflex to Urine Culture; Standing  - Type & Screen Pre Op; Standing        PLAN:  Reviewed imaging, EMB pathology with patient   Given the extent of her bleeding and anemia, I agree with hysterectomy for definitive management of bleeding.   Recommend TLH  She agrees  Discussed planned procedure and associated risks in detail.  Discussed oophorectomy and associated risks including increased risk for cardiovascular disease, osteoporosis, menopausal symptoms (hot flashes, vaginal dryness, etc), and cognitive decline.  She desires ovarian removal at time of surgery.  Consent given for procedure  Plan for [facility] on 2/7/24  NPO after midnight before surgery  RTC POST OP

## 2024-01-09 NOTE — H&P (VIEW-ONLY)
History & Physical    SUBJECTIVE:     History of Present Illness:  Patient is a 48 y.o.  is a patient of Dr Schmidt's in today to discuss hysterectomy due to menorrhagia to anemia and uterine fibroids. S/p EMB 23 with benign pathology. Reports menses q month, lasting 7 days, heavy in flow, moderate dysmenorrhea. Reports positive intermenstrual bleeding. Soils her clothes and changes pads every 1-2 hours.  She has recently been seeing a hematologist for neutropenia.  Hematologist believes she needs a hysterectomy as well.      LMP: 2023  Frequency: monthly  Cycle Length: 7 days  Flow: heavy  Intermenstrual bleeding: Yes  Postcoital bleeding: No  Dysmenorrhea: Yes moderte   Sexually active: Yes   Dyspareunia: No  Contraception: None  H/o STI: TV  Last pap: 23, NIL neg HPV  H/o abnl pap: yes, age 20's  Colposcopy: x2   Gardasil: 0/3  MM23 Birads 2  H/o abnl MMG: yes, 30's- dense breast  Colonoscopy: n/a        Per Dr Schmidt previous note 23:  History of Present Illness:  Patient is a 48 y.o.  presents to discuss pathology results from recent EMB secondary to menorrhagia and uterine fibroids.Having monthly cycles lasting 7 days with heavy flow. Denies dizziness or feeling lightheaded.     IMAGING  US pelvis  23  - Uterus:  8.0 x 5.3 x 8.8 cm; multiple (at least 3) benign-appearing intramural fibroids measuring as large as 4 cm  - ES: 1.5 cm   - ROV: 3.5 x 2.3 x 2.4 cm  - LOV: 2.2 x 1.1 x 1.9 cm; 2 small (1.6 cm or less), moderately tender, benig     US pelvis 21  - uterus: 9.5 x 6.1 x 8.4 cm; ill-defined, intramural fibroids measuring as large as 4.0 cm   - ES: 1.9 cm  - ROV: 4.3 x 1.6 x 2.6 cm; A 1.7 cm cyst with internal echogenic debris  - LOV: 2.2 x 1.2 x 2.5 cm     PATHOLOGY/CULTURES  EMB 23 EMB, proliferative phase endometrium and no hyperplasia or carcinoma  Pap 23 NIL, neg HPV, neg gc/cz/tv/myco/urea     LABS  23  H&H: .1  Iron: 102  TIBC:  274  Ferritin: 33.94     23  - Pap: Negative cytology, neg HPV, neg gc/cz/tv  - Cultures: neg nyco/urea  23:  History of Present Illness:  Patient is a 47 y.o.  presents to discuss recent pelvic imaging secondary to menometrorrhagia and uterine fibroids.     23:  Chief Complaint:   Well Woman (Annual Gyn Exam.  LMP: 23. Taking Desogen~Needs Refills.  +Uterine Fibroids.  /)      History of Present Illness:  Efraín Reyes is a 47 y.o. year old No obstetric history on file. here for her annual exam. Currently using desogen for birth control. Patient has monthly cycles with normal flow lasting 5-6 days. Admits to spotting a couple weeks following cycle. Denies any irregular menstrual bleeding.       Chief Complaint   Patient presents with    Multidisciplinary Discussion     Possible Hysterectomy       Review of patient's allergies indicates:  No Known Allergies    Current Outpatient Medications   Medication Sig Dispense Refill    ascorbic acid, vitamin C, (VITAMIN C) 1000 MG tablet Take 1 tablet by mouth once daily.      CELEXA 10 mg tablet Take 10 mg by mouth.      cholecalciferol, vitamin D3, 125 mcg (5,000 unit) capsule Take 1 capsule by mouth once daily.      co-enzyme Q-10 30 mg capsule Take 30 mg by mouth 3 (three) times daily.      cyanocobalamin 500 MCG tablet Take 500 mcg by mouth 2 (two) times a day.      cyclobenzaprine (FLEXERIL) 10 MG tablet Take 10 mg by mouth 2 (two) times daily as needed.      eszopiclone (LUNESTA) 3 mg Tab Take 3 mg by mouth nightly as needed.      ferrous sulfate 325 (65 FE) MG EC tablet Take 325 mg by mouth once daily.      folic acid (FOLVITE) 800 MCG Tab Take 800 mcg by mouth once daily.      ibuprofen (ADVIL,MOTRIN) 800 MG tablet 1 tablet with food or milk as needed Orally every 8 hrs      iron-vitamin C 100-250 mg, ICAR-C, (ICAR-C) 100-250 mg Tab Take 1 tablet by mouth 2 (two) times a day. 60 tablet 3    meloxicam (MOBIC) 7.5 MG tablet       vitamin A  13217 UNIT capsule Take 10,000 Units by mouth once daily.      vitamin E 400 UNIT capsule Take 400 Units by mouth once daily.      zinc acetate 50 mg (zinc) Cap Take 1 capsule by mouth once daily.       No current facility-administered medications for this visit.       Past Medical History:   Diagnosis Date    Anemia     Benign neoplasm of nerve sheath     Cervical pain     Lumbar radiculopathy     Muscle spasticity     Muscle wasting and atrophy, not elsewhere classified, other site     Neutropenia, unspecified type     Thoracic back pain      Past Surgical History:   Procedure Laterality Date    C6-T1 LAMINECTOMIES W/ EXCISION OF RIGHT C8 SCHWANNOMA  05/01/2020    Dr. Orlando    ENDOMETRIAL BIOPSY  2023     Family History   Problem Relation Age of Onset    Heart disease Mother     Hyperlipidemia Mother     Kidney disease Mother     Peripheral vascular disease Mother     Diabetes Mother     Stroke Father     Heart disease Father     Hyperlipidemia Father     Breast cancer Neg Hx     Colon cancer Neg Hx     Ovarian cancer Neg Hx     Uterine cancer Neg Hx     Cervical cancer Neg Hx      Social History     Tobacco Use    Smoking status: Never     Passive exposure: Never    Smokeless tobacco: Never   Substance Use Topics    Alcohol use: Yes     Comment: occasional    Drug use: Never        Review of Systems:  Review of Systems  Constitutional:  Negative for chills and fever.   Gastrointestinal:  Negative for abdominal pain, constipation and diarrhea.   Genitourinary:  Positive for dysmenorrhea, menorrhagia and menstrual problem. Negative for bladder incontinence, decreased libido, dyspareunia, dysuria, flank pain, frequency, genital sores, hematuria, hot flashes, pelvic pain, urgency, vaginal bleeding, vaginal discharge, vaginal pain, urinary incontinence, postcoital bleeding, postmenopausal bleeding, vaginal dryness and vaginal odor.   OBJECTIVE:     Vital Signs (Most Recent)  Temp: 97 °F (36.1 °C) (01/09/24 0958)  BP:  136/82 (01/09/24 0958)     97.7 kg (215 lb 6.2 oz)     Physical Exam:  Physical Exam  deferred    ASSESSMENT/PLAN:     1. Menorrhagia with regular cycle  - Case Request Operating Room: HYSTERECTOMY,TOTAL,LAPAROSCOPIC,WITH SALPINGO-OOPHORECTOMY  - Full code; Standing  - Vital signs; Standing  - Insert peripheral IV; Standing  - Pascal to Gravity; Standing  - POCT glucose; Standing  - Notify physician if BS > 180 for hysterectomy patients; Standing  - Chlorhexidine (CHG) 2% Wipes; Standing  - Notify Physician/Vital Signs Parameters Urine output less than 0.5mL/kg/hr (with indwelling catheter) or 30 mL/hr (without indwelling catheter) or blood glucose greater than 200 mg/dL; Standing  - Notify physician; Standing  - Notify Physician - Potential Need of Opioid Reversal; Standing  - Diet NPO; Standing  - Chlorohexidine Gluconate Bath; Standing  - Place in Outpatient; Standing  - Place sequential compression device; Standing  - Comprehensive metabolic panel; Standing  - CBC auto differential; Standing  - Pregnancy, urine rapid; Standing  - Urinalysis, Reflex to Urine Culture; Standing  - Type & Screen Pre Op; Standing    2. Fibroids  - Case Request Operating Room: HYSTERECTOMY,TOTAL,LAPAROSCOPIC,WITH SALPINGO-OOPHORECTOMY  - Full code; Standing  - Vital signs; Standing  - Insert peripheral IV; Standing  - Pascal to Gravity; Standing  - POCT glucose; Standing  - Notify physician if BS > 180 for hysterectomy patients; Standing  - Chlorhexidine (CHG) 2% Wipes; Standing  - Notify Physician/Vital Signs Parameters Urine output less than 0.5mL/kg/hr (with indwelling catheter) or 30 mL/hr (without indwelling catheter) or blood glucose greater than 200 mg/dL; Standing  - Notify physician; Standing  - Notify Physician - Potential Need of Opioid Reversal; Standing  - Diet NPO; Standing  - Chlorohexidine Gluconate Bath; Standing  - Place in Outpatient; Standing  - Place sequential compression device; Standing  - Comprehensive metabolic  panel; Standing  - CBC auto differential; Standing  - Pregnancy, urine rapid; Standing  - Urinalysis, Reflex to Urine Culture; Standing  - Type & Screen Pre Op; Standing    3. Iron deficiency anemia due to chronic blood loss  - Case Request Operating Room: HYSTERECTOMY,TOTAL,LAPAROSCOPIC,WITH SALPINGO-OOPHORECTOMY  - Full code; Standing  - Vital signs; Standing  - Insert peripheral IV; Standing  - Pascal to Gravity; Standing  - POCT glucose; Standing  - Notify physician if BS > 180 for hysterectomy patients; Standing  - Chlorhexidine (CHG) 2% Wipes; Standing  - Notify Physician/Vital Signs Parameters Urine output less than 0.5mL/kg/hr (with indwelling catheter) or 30 mL/hr (without indwelling catheter) or blood glucose greater than 200 mg/dL; Standing  - Notify physician; Standing  - Notify Physician - Potential Need of Opioid Reversal; Standing  - Diet NPO; Standing  - Chlorohexidine Gluconate Bath; Standing  - Place in Outpatient; Standing  - Place sequential compression device; Standing  - Comprehensive metabolic panel; Standing  - CBC auto differential; Standing  - Pregnancy, urine rapid; Standing  - Urinalysis, Reflex to Urine Culture; Standing  - Type & Screen Pre Op; Standing    4. BMI 33.0-33.9,adult  - Case Request Operating Room: HYSTERECTOMY,TOTAL,LAPAROSCOPIC,WITH SALPINGO-OOPHORECTOMY  - Full code; Standing  - Vital signs; Standing  - Insert peripheral IV; Standing  - Pascal to Gravity; Standing  - POCT glucose; Standing  - Notify physician if BS > 180 for hysterectomy patients; Standing  - Chlorhexidine (CHG) 2% Wipes; Standing  - Notify Physician/Vital Signs Parameters Urine output less than 0.5mL/kg/hr (with indwelling catheter) or 30 mL/hr (without indwelling catheter) or blood glucose greater than 200 mg/dL; Standing  - Notify physician; Standing  - Notify Physician - Potential Need of Opioid Reversal; Standing  - Diet NPO; Standing  - Chlorohexidine Gluconate Bath; Standing  - Place in Outpatient;  Standing  - Place sequential compression device; Standing  - Comprehensive metabolic panel; Standing  - CBC auto differential; Standing  - Pregnancy, urine rapid; Standing  - Urinalysis, Reflex to Urine Culture; Standing  - Type & Screen Pre Op; Standing        PLAN:  Reviewed imaging, EMB pathology with patient   Given the extent of her bleeding and anemia, I agree with hysterectomy for definitive management of bleeding.   Recommend TLH  She agrees  Discussed planned procedure and associated risks in detail.  Discussed oophorectomy and associated risks including increased risk for cardiovascular disease, osteoporosis, menopausal symptoms (hot flashes, vaginal dryness, etc), and cognitive decline.  She desires ovarian removal at time of surgery.  Consent given for procedure  Plan for [facility] on 2/7/24  NPO after midnight before surgery  RTC POST OP

## 2024-01-09 NOTE — PROGRESS NOTES
Chief Complaint     Multidisciplinary Discussion (Possible Hysterectomy)    HPI:     Patient is a 48 y.o.  is a patient of Dr Schmidt's in today to discuss hysterectomy due to menorrhagia, uterine fibroids. S/p EMB 23. Reports menses q month, lasting 7 days, heavy in flow, moderate dysmenorrhea. Reports positive intermenstrual bleeding.     LMP: 2023  Frequency: monthly  Cycle Length: 7 days  Flow: heavy  Intermenstrual bleeding: Yes  Postcoital bleeding: No  Dysmenorrhea: Yes moderte   Sexually active: Yes   Dyspareunia: No  Contraception: None  H/o STI: TV  Last pap: 23, NIL neg HPV  H/o abnl pap: yes, age 20's  Colposcopy: x2   Gardasil: 0/3  MM23 Birads 2  H/o abnl MMG: yes, 30's- dense breast  Colonoscopy: n/a      Per Dr Schmidt previous note 23:  History of Present Illness:  Patient is a 48 y.o.  presents to discuss pathology results from recent EMB secondary to menorrhagia and uterine fibroids.Having monthly cycles lasting 7 days with heavy flow. Denies dizziness or feeling lightheaded.    IMAGING  US pelvis  23  - Uterus:  8.0 x 5.3 x 8.8 cm; multiple (at least 3) benign-appearing intramural fibroids measuring as large as 4 cm  - ES: 1.5 cm   - ROV: 3.5 x 2.3 x 2.4 cm  - LOV: 2.2 x 1.1 x 1.9 cm; 2 small (1.6 cm or less), moderately tender, benig     US pelvis 21  - uterus: 9.5 x 6.1 x 8.4 cm; ill-defined, intramural fibroids measuring as large as 4.0 cm   - ES: 1.9 cm  - ROV: 4.3 x 1.6 x 2.6 cm; A 1.7 cm cyst with internal echogenic debris  - LOV: 2.2 x 1.2 x 2.5 cm     PATHOLOGY/CULTURES  EMB 23 EMB, proliferative phase endometrium and no hyperplasia or carcinoma  Pap 23 NIL, neg HPV, neg gc/cz/tv/myco/urea     LABS  23  H&H: 35.1  Iron: 102  TIBC: 274  Ferritin: 33.94     23  - Pap: Negative cytology, neg HPV, neg gc/cz/tv  - Cultures: neg nyco/urea  23:  History of Present Illness:  Patient is a 47 y.o.  presents to  discuss recent pelvic imaging secondary to menometrorrhagia and uterine fibroids.    23:  Chief Complaint:   Well Woman (Annual Gyn Exam.  LMP: 23. Taking Desogen~Needs Refills.  +Uterine Fibroids.  /)      History of Present Illness:  Efraín Reyes is a 47 y.o. year old No obstetric history on file. here for her annual exam. Currently using desogen for birth control. Patient has monthly cycles with normal flow lasting 5-6 days. Admits to spotting a couple weeks following cycle. Denies any irregular menstrual bleeding.    Past Medical History:   Diagnosis Date    Anemia     Benign neoplasm of nerve sheath     Cervical pain     Lumbar radiculopathy     Muscle spasticity     Muscle wasting and atrophy, not elsewhere classified, other site     Neutropenia, unspecified type     Thoracic back pain        Past Surgical History:   Procedure Laterality Date    C6-T1 LAMINECTOMIES W/ EXCISION OF RIGHT C8 SCHWANNOMA  2020    Dr. Orlando    ENDOMETRIAL BIOPSY         Family History   Problem Relation Age of Onset    Heart disease Mother     Hyperlipidemia Mother     Kidney disease Mother     Peripheral vascular disease Mother     Diabetes Mother     Stroke Father     Heart disease Father     Hyperlipidemia Father     Breast cancer Neg Hx     Colon cancer Neg Hx     Ovarian cancer Neg Hx     Uterine cancer Neg Hx     Cervical cancer Neg Hx        OB History          1    Para   1    Term   1            AB        Living   1         SAB        IAB        Ectopic        Multiple        Live Births   1                 Current Outpatient Medications on File Prior to Visit   Medication Sig Dispense Refill    ascorbic acid, vitamin C, (VITAMIN C) 1000 MG tablet Take 1 tablet by mouth once daily.      CELEXA 10 mg tablet Take 10 mg by mouth.      cholecalciferol, vitamin D3, 125 mcg (5,000 unit) capsule Take 1 capsule by mouth once daily.      co-enzyme Q-10 30 mg capsule Take 30 mg by mouth 3 (three)  times daily.      cyanocobalamin 500 MCG tablet Take 500 mcg by mouth 2 (two) times a day.      cyclobenzaprine (FLEXERIL) 10 MG tablet Take 10 mg by mouth 2 (two) times daily as needed.      eszopiclone (LUNESTA) 3 mg Tab Take 3 mg by mouth nightly as needed.      ferrous sulfate 325 (65 FE) MG EC tablet Take 325 mg by mouth once daily.      folic acid (FOLVITE) 800 MCG Tab Take 800 mcg by mouth once daily.      ibuprofen (ADVIL,MOTRIN) 800 MG tablet 1 tablet with food or milk as needed Orally every 8 hrs      iron-vitamin C 100-250 mg, ICAR-C, (ICAR-C) 100-250 mg Tab Take 1 tablet by mouth 2 (two) times a day. 60 tablet 3    meloxicam (MOBIC) 7.5 MG tablet       vitamin A 29744 UNIT capsule Take 10,000 Units by mouth once daily.      vitamin E 400 UNIT capsule Take 400 Units by mouth once daily.      zinc acetate 50 mg (zinc) Cap Take 1 capsule by mouth once daily.       No current facility-administered medications on file prior to visit.       Review of Systems:       Review of Systems   Constitutional:  Negative for chills and fever.   Gastrointestinal:  Negative for abdominal pain, constipation and diarrhea.   Genitourinary:  Positive for dysmenorrhea, menorrhagia and menstrual problem. Negative for bladder incontinence, decreased libido, dyspareunia, dysuria, flank pain, frequency, genital sores, hematuria, hot flashes, pelvic pain, urgency, vaginal bleeding, vaginal discharge, vaginal pain, urinary incontinence, postcoital bleeding, postmenopausal bleeding, vaginal dryness and vaginal odor.        Physical Exam:    /82 (BP Location: Right arm, Patient Position: Sitting, BP Method: Medium (Manual))   Temp 97 °F (36.1 °C) (Temporal)   Wt 97.7 kg (215 lb 6.2 oz)   LMP 12/19/2023 (Exact Date)   BMI 33.73 kg/m²     Physical Exam     deferred  Assessment:   1. Menorrhagia with regular cycle    2. Fibroids    3. Iron deficiency anemia due to chronic blood loss    4. BMI 33.0-33.9,adult             Plan:    1. Menorrhagia with regular cycle    2. Fibroids    3. Iron deficiency anemia due to chronic blood loss    4. BMI 33.0-33.9,adult      Reviewed imaging, EMB pathology with patient   Discussed treatment options of Menorrhagia, fibroids via medication, surgical management. Pt desires to discuss hysterectomy.   Discussed planned procedure and associated risks in detail.  Discussed oophorectomy and associated risks including increased risk for cardiovascular disease, osteoporosis, menopausal symptoms (hot flashes, vaginal dryness, etc), and cognitive decline.  Consent given for procedure  Plan for surgery on   NPO after midnight before surgery

## 2024-02-01 ENCOUNTER — HOSPITAL ENCOUNTER (OUTPATIENT)
Dept: PREADMISSION TESTING | Facility: HOSPITAL | Age: 49
Discharge: HOME | End: 2024-02-01
Attending: OBSTETRICS & GYNECOLOGY
Payer: COMMERCIAL

## 2024-02-01 VITALS — BODY MASS INDEX: 32.58 KG/M2 | HEIGHT: 68 IN | WEIGHT: 215 LBS

## 2024-02-01 DIAGNOSIS — D50.0 IRON DEFICIENCY ANEMIA DUE TO CHRONIC BLOOD LOSS: ICD-10-CM

## 2024-02-01 DIAGNOSIS — D21.9 FIBROIDS: ICD-10-CM

## 2024-02-01 DIAGNOSIS — N92.0 MENORRHAGIA WITH REGULAR CYCLE: ICD-10-CM

## 2024-02-01 LAB
ALBUMIN SERPL-MCNC: 4.4 G/DL (ref 3.4–5)
ALBUMIN/GLOB SERPL: 1.3 RATIO
ALP SERPL-CCNC: 74 UNIT/L (ref 50–144)
ALT SERPL-CCNC: 16 UNIT/L (ref 1–45)
ANION GAP SERPL CALC-SCNC: 5 MEQ/L (ref 2–13)
APPEARANCE UR: CLEAR
AST SERPL-CCNC: 27 UNIT/L (ref 14–36)
BASOPHILS # BLD AUTO: 0.02 X10(3)/MCL (ref 0.01–0.08)
BASOPHILS NFR BLD AUTO: 0.6 % (ref 0.1–1.2)
BILIRUB SERPL-MCNC: 0.9 MG/DL (ref 0–1)
BILIRUB UR QL STRIP.AUTO: NEGATIVE
BUN SERPL-MCNC: 15 MG/DL (ref 7–20)
CALCIUM SERPL-MCNC: 9.4 MG/DL (ref 8.4–10.2)
CHLORIDE SERPL-SCNC: 105 MMOL/L (ref 98–110)
CO2 SERPL-SCNC: 28 MMOL/L (ref 21–32)
COLOR UR AUTO: YELLOW
CREAT SERPL-MCNC: 0.84 MG/DL (ref 0.66–1.25)
CREAT/UREA NIT SERPL: 18 (ref 12–20)
EOSINOPHIL # BLD AUTO: 0.09 X10(3)/MCL (ref 0.04–0.36)
EOSINOPHIL NFR BLD AUTO: 2.6 % (ref 0.7–7)
ERYTHROCYTE [DISTWIDTH] IN BLOOD BY AUTOMATED COUNT: 14.3 % (ref 11–14.5)
GFR SERPLBLD CREATININE-BSD FMLA CKD-EPI: 86 MLS/MIN/1.73/M2
GLOBULIN SER-MCNC: 3.5 GM/DL (ref 2–3.9)
GLUCOSE SERPL-MCNC: 93 MG/DL (ref 70–115)
GLUCOSE UR QL STRIP.AUTO: NEGATIVE
HCT VFR BLD AUTO: 33.9 % (ref 36–48)
HGB BLD-MCNC: 10.9 G/DL (ref 11.8–16)
IMM GRANULOCYTES # BLD AUTO: 0 X10(3)/MCL (ref 0–0.03)
IMM GRANULOCYTES NFR BLD AUTO: 0 % (ref 0–0.5)
KETONES UR QL STRIP.AUTO: NEGATIVE
LEUKOCYTE ESTERASE UR QL STRIP.AUTO: NEGATIVE
LYMPHOCYTES # BLD AUTO: 1.4 X10(3)/MCL (ref 1.16–3.74)
LYMPHOCYTES NFR BLD AUTO: 40.2 % (ref 20–55)
MCH RBC QN AUTO: 27.8 PG (ref 27–34)
MCHC RBC AUTO-ENTMCNC: 32.2 G/DL (ref 31–37)
MCV RBC AUTO: 86.5 FL (ref 79–99)
MONOCYTES # BLD AUTO: 0.33 X10(3)/MCL (ref 0.24–0.36)
MONOCYTES NFR BLD AUTO: 9.5 % (ref 4.7–12.5)
NEUTROPHILS # BLD AUTO: 1.64 X10(3)/MCL (ref 1.56–6.13)
NEUTROPHILS NFR BLD AUTO: 47.1 % (ref 37–73)
NITRITE UR QL STRIP.AUTO: NEGATIVE
NRBC BLD AUTO-RTO: 0 %
PH UR STRIP.AUTO: 8 [PH]
PLATELET # BLD AUTO: 238 X10(3)/MCL (ref 140–371)
PMV BLD AUTO: 10.3 FL (ref 9.4–12.4)
POTASSIUM SERPL-SCNC: 4.5 MMOL/L (ref 3.5–5.1)
PROT SERPL-MCNC: 7.9 GM/DL (ref 6.3–8.2)
PROT UR QL STRIP.AUTO: NEGATIVE
RBC # BLD AUTO: 3.92 X10(6)/MCL (ref 4–5.1)
RBC UR QL AUTO: NEGATIVE
SODIUM SERPL-SCNC: 138 MMOL/L (ref 135–145)
SP GR UR STRIP.AUTO: 1.02 (ref 1–1.03)
UROBILINOGEN UR STRIP-ACNC: 0.2
WBC # SPEC AUTO: 3.48 X10(3)/MCL (ref 4–11.5)

## 2024-02-01 PROCEDURE — 81003 URINALYSIS AUTO W/O SCOPE: CPT | Performed by: OBSTETRICS & GYNECOLOGY

## 2024-02-01 PROCEDURE — 85025 COMPLETE CBC W/AUTO DIFF WBC: CPT | Performed by: OBSTETRICS & GYNECOLOGY

## 2024-02-01 PROCEDURE — 80053 COMPREHEN METABOLIC PANEL: CPT | Performed by: OBSTETRICS & GYNECOLOGY

## 2024-02-01 NOTE — DISCHARGE INSTRUCTIONS

## 2024-02-06 ENCOUNTER — ANESTHESIA EVENT (OUTPATIENT)
Dept: SURGERY | Facility: HOSPITAL | Age: 49
End: 2024-02-06
Payer: COMMERCIAL

## 2024-02-06 NOTE — ANESTHESIA PREPROCEDURE EVALUATION
2024  Efraín Reyes is a 48 y.o., female.  istory of Present Illness:  Patient is a 48 y.o.  is a patient of Dr Schmidt's in today to discuss hysterectomy due to menorrhagia to anemia and uterine fibroids. S/p EMB 23 with benign pathology. Reports menses q month, lasting 7 days, heavy in flow, moderate dysmenorrhea. Reports positive intermenstrual bleeding. Soils her clothes and changes pads every 1-2 hours.  She has recently been seeing a hematologist for neutropenia.  Hematologist believes she needs a hysterectomy as well.      LMP: 2023  Frequency: monthly  Cycle Length: 7 days  Flow: heavy  Intermenstrual bleeding: Yes  Postcoital bleeding: No  Dysmenorrhea: Yes moderte   Sexually active: Yes   Dyspareunia: No  Contraception: None  H/o STI: TV  Last pap: 23, NIL neg HPV  H/o abnl pap: yes, age 20's  Colposcopy: x2   Gardasil: 0/3  MM23 Birads 2  H/o abnl MMG: yes, 30's- dense breast  Colonoscopy: n/a        Per Dr Schmidt previous note 23:  History of Present Illness:  Patient is a 48 y.o.  presents to discuss pathology results from recent EMB secondary to menorrhagia and uterine fibroids.Having monthly cycles lasting 7 days with heavy flow. Denies dizziness or feeling lightheaded.     IMAGING  US pelvis  23  - Uterus:  8.0 x 5.3 x 8.8 cm; multiple (at least 3) benign-appearing intramural fibroids measuring as large as 4 cm  - ES: 1.5 cm   - ROV: 3.5 x 2.3 x 2.4 cm  - LOV: 2.2 x 1.1 x 1.9 cm; 2 small (1.6 cm or less), moderately tender, benig     US pelvis 21  - uterus: 9.5 x 6.1 x 8.4 cm; ill-defined, intramural fibroids measuring as large as 4.0 cm   - ES: 1.9 cm  - ROV: 4.3 x 1.6 x 2.6 cm; A 1.7 cm cyst with internal echogenic debris  - LOV: 2.2 x 1.2 x 2.5 cm     PATHOLOGY/CULTURES  EMB 23 EMB, proliferative phase endometrium and no  hyperplasia or carcinoma  Pap 23 NIL, neg HPV, neg gc/cz/tv/myco/urea     LABS  23  H&H: /35.1  Iron: 102  TIBC: 274  Ferritin: 33.94     23  - Pap: Negative cytology, neg HPV, neg gc/cz/tv  - Cultures: neg nyco/urea  23:  History of Present Illness:  Patient is a 47 y.o.  presents to discuss recent pelvic imaging secondary to menometrorrhagia and uterine fibroids.     23:  Chief Complaint:   Well Woman (Annual Gyn Exam.  LMP: 23. Taking Desogen~Needs Refills.  +Uterine Fibroids.  /)      History of Present Illness:  Efraín Reyes is a 47 y.o. year old No obstetric history on file. here for her annual exam. Currently using desogen for birth control. Patient has monthly cycles with normal flow lasting 5-6 days. Admits to spotting a couple weeks following cycle. Denies any irregular menstrual bleeding.             Chief Complaint   Patient presents with    Multidisciplinary Discussion       Possible Hysterectomy         Review of patient's allergies indicates:  No Known Allergies     Current Medications          Current Outpatient Medications   Medication Sig Dispense Refill    ascorbic acid, vitamin C, (VITAMIN C) 1000 MG tablet Take 1 tablet by mouth once daily.        CELEXA 10 mg tablet Take 10 mg by mouth.        cholecalciferol, vitamin D3, 125 mcg (5,000 unit) capsule Take 1 capsule by mouth once daily.        co-enzyme Q-10 30 mg capsule Take 30 mg by mouth 3 (three) times daily.        cyanocobalamin 500 MCG tablet Take 500 mcg by mouth 2 (two) times a day.        cyclobenzaprine (FLEXERIL) 10 MG tablet Take 10 mg by mouth 2 (two) times daily as needed.        eszopiclone (LUNESTA) 3 mg Tab Take 3 mg by mouth nightly as needed.        ferrous sulfate 325 (65 FE) MG EC tablet Take 325 mg by mouth once daily.        folic acid (FOLVITE) 800 MCG Tab Take 800 mcg by mouth once daily.        ibuprofen (ADVIL,MOTRIN) 800 MG tablet 1 tablet with food or milk as needed Orally  every 8 hrs        iron-vitamin C 100-250 mg, ICAR-C, (ICAR-C) 100-250 mg Tab Take 1 tablet by mouth 2 (two) times a day. 60 tablet 3    meloxicam (MOBIC) 7.5 MG tablet          vitamin A 38288 UNIT capsule Take 10,000 Units by mouth once daily.        vitamin E 400 UNIT capsule Take 400 Units by mouth once daily.        zinc acetate 50 mg (zinc) Cap Take 1 capsule by mouth once daily.          No current facility-administered medications for this visit.                 Past Medical History:   Diagnosis Date    Anemia      Benign neoplasm of nerve sheath      Cervical pain      Lumbar radiculopathy      Muscle spasticity      Muscle wasting and atrophy, not elsewhere classified, other site      Neutropenia, unspecified type      Thoracic back pain              Past Surgical History:   Procedure Laterality Date    C6-T1 LAMINECTOMIES W/ EXCISION OF RIGHT C8 SCHWANNOMA   05/01/2020     Dr. Orlando    ENDOMETRIAL BIOPSY   2023            Family History   Problem Relation Age of Onset    Heart disease Mother      Hyperlipidemia Mother      Kidney disease Mother      Peripheral vascular disease Mother      Diabetes Mother      Stroke Father      Heart disease Father      Hyperlipidemia Father      Breast cancer Neg Hx      Colon cancer Neg Hx      Ovarian cancer Neg Hx      Uterine cancer Neg Hx      Cervical cancer Neg Hx        Social History            Tobacco Use    Smoking status: Never       Passive exposure: Never    Smokeless tobacco: Never   Substance Use Topics    Alcohol use: Yes       Comment: occasional    Drug use: Never         Review of Systems:  Review of Systems  Constitutional:  Negative for chills and fever.   Gastrointestinal:  Negative for abdominal pain, constipation and diarrhea.   Genitourinary:  Positive for dysmenorrhea, menorrhagia and menstrual problem. Negative for bladder incontinence, decreased libido, dyspareunia, dysuria, flank pain, frequency, genital sores, hematuria, hot flashes,  pelvic pain, urgency, vaginal bleeding, vaginal discharge, vaginal pain, urinary incontinence, postcoital bleeding, postmenopausal bleeding, vaginal dryness and vaginal odor.   OBJECTIVE:      Vital Signs (Most Recent)  Temp: 97 °F (36.1 °C) (01/09/24 0958)  BP: 136/82 (01/09/24 0958)  97.7 kg (215 lb 6.2 oz)      Physical Exam:  Physical Exam  deferred     ASSESSMENT/PLAN:      1. Menorrhagia with regular cycle  - Case Request Operating Room: HYSTERECTOMY,TOTAL,LAPAROSCOPIC,WITH SALPINGO-OOPHORECTOMY  - Full code; Standing  - Vital signs; Standing  - Insert peripheral IV; Standing  - Pascal to Gravity; Standing  - POCT glucose; Standing  - Notify physician if BS > 180 for hysterectomy patients; Standing  - Chlorhexidine (CHG) 2% Wipes; Standing  - Notify Physician/Vital Signs Parameters Urine output less than 0.5mL/kg/hr (with indwelling catheter) or 30 mL/hr (without indwelling catheter) or blood glucose greater than 200 mg/dL; Standing  - Notify physician; Standing  - Notify Physician - Potential Need of Opioid Reversal; Standing  - Diet NPO; Standing  - Chlorohexidine Gluconate Bath; Standing  - Place in Outpatient; Standing  - Place sequential compression device; Standing  - Comprehensive metabolic panel; Standing  - CBC auto differential; Standing  - Pregnancy, urine rapid; Standing  - Urinalysis, Reflex to Urine Culture; Standing  - Type & Screen Pre Op; Standing     2. Fibroids  - Case Request Operating Room: HYSTERECTOMY,TOTAL,LAPAROSCOPIC,WITH SALPINGO-OOPHORECTOMY  - Full code; Standing  - Vital signs; Standing  - Insert peripheral IV; Standing  - Pascal to Gravity; Standing  - POCT glucose; Standing  - Notify physician if BS > 180 for hysterectomy patients; Standing  - Chlorhexidine (CHG) 2% Wipes; Standing  - Notify Physician/Vital Signs Parameters Urine output less than 0.5mL/kg/hr (with indwelling catheter) or 30 mL/hr (without indwelling catheter) or blood glucose greater than 200 mg/dL; Standing  -  Notify physician; Standing  - Notify Physician - Potential Need of Opioid Reversal; Standing  - Diet NPO; Standing  - Chlorohexidine Gluconate Bath; Standing  - Place in Outpatient; Standing  - Place sequential compression device; Standing  - Comprehensive metabolic panel; Standing  - CBC auto differential; Standing  - Pregnancy, urine rapid; Standing  - Urinalysis, Reflex to Urine Culture; Standing  - Type & Screen Pre Op; Standing     3. Iron deficiency anemia due to chronic blood loss  - Case Request Operating Room: HYSTERECTOMY,TOTAL,LAPAROSCOPIC,WITH SALPINGO-OOPHORECTOMY  - Full code; Standing  - Vital signs; Standing  - Insert peripheral IV; Standing  - Pascal to Gravity; Standing  - POCT glucose; Standing  - Notify physician if BS > 180 for hysterectomy patients; Standing  - Chlorhexidine (CHG) 2% Wipes; Standing  - Notify Physician/Vital Signs Parameters Urine output less than 0.5mL/kg/hr (with indwelling catheter) or 30 mL/hr (without indwelling catheter) or blood glucose greater than 200 mg/dL; Standing  - Notify physician; Standing  - Notify Physician - Potential Need of Opioid Reversal; Standing  - Diet NPO; Standing  - Chlorohexidine Gluconate Bath; Standing  - Place in Outpatient; Standing  - Place sequential compression device; Standing  - Comprehensive metabolic panel; Standing  - CBC auto differential; Standing  - Pregnancy, urine rapid; Standing  - Urinalysis, Reflex to Urine Culture; Standing  - Type & Screen Pre Op; Standing     4. BMI 33.0-33.9,adult  - Case Request Operating Room: HYSTERECTOMY,TOTAL,LAPAROSCOPIC,WITH SALPINGO-OOPHORECTOMY  - Full code; Standing  - Vital signs; Standing  - Insert peripheral IV; Standing  - Pascal to Gravity; Standing  - POCT glucose; Standing  - Notify physician if BS > 180 for hysterectomy patients; Standing  - Chlorhexidine (CHG) 2% Wipes; Standing  - Notify Physician/Vital Signs Parameters Urine output less than 0.5mL/kg/hr (with indwelling catheter) or 30  mL/hr (without indwelling catheter) or blood glucose greater than 200 mg/dL; Standing  - Notify physician; Standing  - Notify Physician - Potential Need of Opioid Reversal; Standing  - Diet NPO; Standing  - Chlorohexidine Gluconate Bath; Standing  - Place in Outpatient; Standing  - Place sequential compression device; Standing  - Comprehensive metabolic panel; Standing  - CBC auto differential; Standing  - Pregnancy, urine rapid; Standing  - Urinalysis, Reflex to Urine Culture; Standing  - Type & Screen Pre Op; Standing           PLAN:  Reviewed imaging, EMB pathology with patient   Given the extent of her bleeding and anemia, I agree with hysterectomy for definitive management of bleeding.   Recommend [facility]  She agrees  Discussed planned procedure and associated risks in detail.  Discussed oophorectomy and associated risks including increased risk for cardiovascular disease, osteoporosis, menopausal symptoms (hot flashes, vaginal dryness, etc), and cognitive decline.  She desires ovarian removal at time of surgery.  Consent given for procedure  Plan for [facility]SO on 2/7/24  NPO after midnight before surgery  RTC POST OP         Revision History         Pre-op Assessment    I have reviewed the Patient Summary Reports.     I have reviewed the Nursing Notes. I have reviewed the NPO Status.   I have reviewed the Medications.     Review of Systems  Anesthesia Hx:  No problems with previous Anesthesia             Denies Family Hx of Anesthesia complications.    Denies Personal Hx of Anesthesia complications.                    Social:  Alcohol Use       Hematology/Oncology:    Oncology Normal    -- Anemia:               Hematology Comments: Nutropenia                    EENT/Dental:  EENT/Dental Normal           Cardiovascular:  Cardiovascular Normal Exercise tolerance: poor                                           Pulmonary:  Pulmonary Normal                       Renal/:  Renal/ Normal                  Hepatic/GI:  Hepatic/GI Normal                 Musculoskeletal:     MUSCLE ATROPHY AND WASTING       Spine Disorders: cervical, lumbar and thoracic            Neurological:    Neuromuscular Disease,                                   Endocrine:  Endocrine Normal          Obesity / BMI > 30  Dermatological:  Skin Normal    Psych:  Psychiatric Normal                    Physical Exam  General: Well nourished, Cooperative, Alert and Oriented    Airway:  Mallampati: II / II  Mouth Opening: Normal  TM Distance: Normal  Tongue: Normal  Neck ROM: Normal ROM    Dental:  Intact        Anesthesia Plan  Type of Anesthesia, risks & benefits discussed:    Anesthesia Type: Gen ETT  Intra-op Monitoring Plan: Standard ASA Monitors  Post Op Pain Control Plan: multimodal analgesia  Induction:  IV  Airway Plan: Direct  Informed Consent: Informed consent signed with the Patient and all parties understand the risks and agree with anesthesia plan.  All questions answered. Patient consented to blood products? Yes  ASA Score: 4  Day of Surgery Review of History & Physical: H&P Update referred to the surgeon/provider.I have interviewed and examined the patient. I have reviewed the patient's H&P dated: There are no significant changes.     Ready For Surgery From Anesthesia Perspective.     .

## 2024-02-07 ENCOUNTER — ANESTHESIA (OUTPATIENT)
Dept: SURGERY | Facility: HOSPITAL | Age: 49
End: 2024-02-07
Payer: COMMERCIAL

## 2024-02-07 ENCOUNTER — HOSPITAL ENCOUNTER (OUTPATIENT)
Facility: HOSPITAL | Age: 49
Discharge: HOME | End: 2024-02-07
Attending: OBSTETRICS & GYNECOLOGY | Admitting: OBSTETRICS & GYNECOLOGY
Payer: COMMERCIAL

## 2024-02-07 DIAGNOSIS — Z90.710 S/P LAPAROSCOPIC HYSTERECTOMY: Primary | ICD-10-CM

## 2024-02-07 DIAGNOSIS — D50.0 IRON DEFICIENCY ANEMIA DUE TO CHRONIC BLOOD LOSS: ICD-10-CM

## 2024-02-07 DIAGNOSIS — D21.9 FIBROIDS: ICD-10-CM

## 2024-02-07 DIAGNOSIS — N92.0 MENORRHAGIA WITH REGULAR CYCLE: ICD-10-CM

## 2024-02-07 DIAGNOSIS — Z90.710 STATUS POST LAPAROSCOPIC HYSTERECTOMY: ICD-10-CM

## 2024-02-07 LAB
ABO AND RH: NORMAL
ABORH RETYPE: NORMAL
ANTIBODY SCREEN: NORMAL
B-HCG SERPL QL: NEGATIVE
HCT VFR BLD AUTO: 31.9 % (ref 36–48)
HGB BLD-MCNC: 10.6 G/DL (ref 11.8–16)
SPECIMEN OUTDATE: NORMAL

## 2024-02-07 PROCEDURE — 81025 URINE PREGNANCY TEST: CPT | Performed by: OBSTETRICS & GYNECOLOGY

## 2024-02-07 PROCEDURE — 25000003 PHARM REV CODE 250

## 2024-02-07 PROCEDURE — 86901 BLOOD TYPING SEROLOGIC RH(D): CPT | Performed by: OBSTETRICS & GYNECOLOGY

## 2024-02-07 PROCEDURE — 71000015 HC POSTOP RECOV 1ST HR: Performed by: OBSTETRICS & GYNECOLOGY

## 2024-02-07 PROCEDURE — 25000003 PHARM REV CODE 250: Performed by: NURSE ANESTHETIST, CERTIFIED REGISTERED

## 2024-02-07 PROCEDURE — 37000008 HC ANESTHESIA 1ST 15 MINUTES: Performed by: OBSTETRICS & GYNECOLOGY

## 2024-02-07 PROCEDURE — 63600175 PHARM REV CODE 636 W HCPCS

## 2024-02-07 PROCEDURE — 25000003 PHARM REV CODE 250: Performed by: OBSTETRICS & GYNECOLOGY

## 2024-02-07 PROCEDURE — 71000033 HC RECOVERY, INTIAL HOUR: Performed by: OBSTETRICS & GYNECOLOGY

## 2024-02-07 PROCEDURE — 36000711: Performed by: OBSTETRICS & GYNECOLOGY

## 2024-02-07 PROCEDURE — 85018 HEMOGLOBIN: CPT

## 2024-02-07 PROCEDURE — 36415 COLL VENOUS BLD VENIPUNCTURE: CPT | Performed by: OBSTETRICS & GYNECOLOGY

## 2024-02-07 PROCEDURE — 63600175 PHARM REV CODE 636 W HCPCS: Performed by: NURSE ANESTHETIST, CERTIFIED REGISTERED

## 2024-02-07 PROCEDURE — 63600175 PHARM REV CODE 636 W HCPCS: Performed by: OBSTETRICS & GYNECOLOGY

## 2024-02-07 PROCEDURE — 36000710: Performed by: OBSTETRICS & GYNECOLOGY

## 2024-02-07 PROCEDURE — 58571 TLH W/T/O 250 G OR LESS: CPT | Mod: ,,, | Performed by: OBSTETRICS & GYNECOLOGY

## 2024-02-07 PROCEDURE — C1729 CATH, DRAINAGE: HCPCS | Performed by: OBSTETRICS & GYNECOLOGY

## 2024-02-07 PROCEDURE — 63600175 PHARM REV CODE 636 W HCPCS: Mod: JZ,JG | Performed by: OBSTETRICS & GYNECOLOGY

## 2024-02-07 PROCEDURE — 37000009 HC ANESTHESIA EA ADD 15 MINS: Performed by: OBSTETRICS & GYNECOLOGY

## 2024-02-07 PROCEDURE — 36415 COLL VENOUS BLD VENIPUNCTURE: CPT

## 2024-02-07 PROCEDURE — 27201423 OPTIME MED/SURG SUP & DEVICES STERILE SUPPLY: Performed by: OBSTETRICS & GYNECOLOGY

## 2024-02-07 PROCEDURE — D9220A PRA ANESTHESIA: Mod: ,,, | Performed by: NURSE ANESTHETIST, CERTIFIED REGISTERED

## 2024-02-07 PROCEDURE — 71000016 HC POSTOP RECOV ADDL HR: Performed by: OBSTETRICS & GYNECOLOGY

## 2024-02-07 RX ORDER — MIDAZOLAM HYDROCHLORIDE 1 MG/ML
2 INJECTION INTRAMUSCULAR; INTRAVENOUS
Status: COMPLETED | OUTPATIENT
Start: 2024-02-07 | End: 2024-02-07

## 2024-02-07 RX ORDER — BISACODYL 10 MG/1
10 SUPPOSITORY RECTAL DAILY PRN
Status: DISCONTINUED | OUTPATIENT
Start: 2024-02-07 | End: 2024-02-07 | Stop reason: HOSPADM

## 2024-02-07 RX ORDER — IBUPROFEN 600 MG/1
600 TABLET ORAL EVERY 6 HOURS
Status: DISCONTINUED | OUTPATIENT
Start: 2024-02-08 | End: 2024-02-07 | Stop reason: HOSPADM

## 2024-02-07 RX ORDER — LIDOCAINE HYDROCHLORIDE 20 MG/ML
INJECTION INTRAVENOUS
Status: DISCONTINUED | OUTPATIENT
Start: 2024-02-07 | End: 2024-02-07

## 2024-02-07 RX ORDER — PROPOFOL 10 MG/ML
VIAL (ML) INTRAVENOUS
Status: DISCONTINUED | OUTPATIENT
Start: 2024-02-07 | End: 2024-02-07

## 2024-02-07 RX ORDER — BUPIVACAINE HYDROCHLORIDE 5 MG/ML
INJECTION, SOLUTION EPIDURAL; INTRACAUDAL
Status: DISCONTINUED | OUTPATIENT
Start: 2024-02-07 | End: 2024-02-07 | Stop reason: HOSPADM

## 2024-02-07 RX ORDER — ONDANSETRON 4 MG/1
8 TABLET, ORALLY DISINTEGRATING ORAL EVERY 8 HOURS PRN
Status: DISCONTINUED | OUTPATIENT
Start: 2024-02-07 | End: 2024-02-07 | Stop reason: HOSPADM

## 2024-02-07 RX ORDER — MUPIROCIN 20 MG/G
OINTMENT TOPICAL
Status: DISCONTINUED | OUTPATIENT
Start: 2024-02-07 | End: 2024-02-07 | Stop reason: HOSPADM

## 2024-02-07 RX ORDER — KETOROLAC TROMETHAMINE 30 MG/ML
30 INJECTION, SOLUTION INTRAMUSCULAR; INTRAVENOUS EVERY 8 HOURS
Status: DISCONTINUED | OUTPATIENT
Start: 2024-02-07 | End: 2024-02-07 | Stop reason: HOSPADM

## 2024-02-07 RX ORDER — FAMOTIDINE 20 MG/1
20 TABLET, FILM COATED ORAL
Status: COMPLETED | OUTPATIENT
Start: 2024-02-07 | End: 2024-02-07

## 2024-02-07 RX ORDER — OXYCODONE AND ACETAMINOPHEN 10; 325 MG/1; MG/1
1 TABLET ORAL EVERY 4 HOURS PRN
Status: DISCONTINUED | OUTPATIENT
Start: 2024-02-07 | End: 2024-02-07 | Stop reason: HOSPADM

## 2024-02-07 RX ORDER — FAMOTIDINE 20 MG/1
20 TABLET, FILM COATED ORAL
Status: DISCONTINUED | OUTPATIENT
Start: 2024-02-07 | End: 2024-02-07 | Stop reason: HOSPADM

## 2024-02-07 RX ORDER — ONDANSETRON HYDROCHLORIDE 2 MG/ML
INJECTION, SOLUTION INTRAVENOUS
Status: DISCONTINUED | OUTPATIENT
Start: 2024-02-07 | End: 2024-02-07

## 2024-02-07 RX ORDER — LABETALOL HYDROCHLORIDE 5 MG/ML
INJECTION, SOLUTION INTRAVENOUS
Status: DISCONTINUED | OUTPATIENT
Start: 2024-02-07 | End: 2024-02-07

## 2024-02-07 RX ORDER — DEXAMETHASONE SODIUM PHOSPHATE 4 MG/ML
INJECTION, SOLUTION INTRA-ARTICULAR; INTRALESIONAL; INTRAMUSCULAR; INTRAVENOUS; SOFT TISSUE
Status: DISCONTINUED | OUTPATIENT
Start: 2024-02-07 | End: 2024-02-07

## 2024-02-07 RX ORDER — FENTANYL CITRATE 50 UG/ML
INJECTION, SOLUTION INTRAMUSCULAR; INTRAVENOUS
Status: DISCONTINUED | OUTPATIENT
Start: 2024-02-07 | End: 2024-02-07

## 2024-02-07 RX ORDER — IBUPROFEN 400 MG/1
400 TABLET ORAL ONCE
Status: DISCONTINUED | OUTPATIENT
Start: 2024-02-07 | End: 2024-02-07

## 2024-02-07 RX ORDER — DIPHENHYDRAMINE HYDROCHLORIDE 50 MG/ML
25 INJECTION INTRAMUSCULAR; INTRAVENOUS EVERY 4 HOURS PRN
Status: DISCONTINUED | OUTPATIENT
Start: 2024-02-07 | End: 2024-02-07 | Stop reason: HOSPADM

## 2024-02-07 RX ORDER — SODIUM CHLORIDE 9 MG/ML
INJECTION, SOLUTION INTRAVENOUS CONTINUOUS
Status: DISCONTINUED | OUTPATIENT
Start: 2024-02-07 | End: 2024-02-07 | Stop reason: HOSPADM

## 2024-02-07 RX ORDER — OXYCODONE AND ACETAMINOPHEN 5; 325 MG/1; MG/1
1 TABLET ORAL EVERY 4 HOURS PRN
Status: DISCONTINUED | OUTPATIENT
Start: 2024-02-07 | End: 2024-02-07 | Stop reason: HOSPADM

## 2024-02-07 RX ORDER — TRIPROLIDINE/PSEUDOEPHEDRINE 2.5MG-60MG
30 TABLET ORAL EVERY 6 HOURS PRN
Qty: 480 ML | Refills: 2 | Status: SHIPPED | OUTPATIENT
Start: 2024-02-07 | End: 2024-05-23

## 2024-02-07 RX ORDER — IBUPROFEN 600 MG/1
600 TABLET ORAL ONCE
Status: COMPLETED | OUTPATIENT
Start: 2024-02-07 | End: 2024-02-07

## 2024-02-07 RX ORDER — NEOSTIGMINE METHYLSULFATE 1 MG/ML
INJECTION, SOLUTION INTRAVENOUS
Status: DISCONTINUED | OUTPATIENT
Start: 2024-02-07 | End: 2024-02-07

## 2024-02-07 RX ORDER — DIPHENHYDRAMINE HCL 25 MG
25 CAPSULE ORAL EVERY 4 HOURS PRN
Status: DISCONTINUED | OUTPATIENT
Start: 2024-02-07 | End: 2024-02-07 | Stop reason: HOSPADM

## 2024-02-07 RX ORDER — VECURONIUM BROMIDE FOR INJECTION 1 MG/ML
INJECTION, POWDER, LYOPHILIZED, FOR SOLUTION INTRAVENOUS
Status: DISCONTINUED | OUTPATIENT
Start: 2024-02-07 | End: 2024-02-07

## 2024-02-07 RX ORDER — HYDROMORPHONE HYDROCHLORIDE 1 MG/ML
1 INJECTION, SOLUTION INTRAMUSCULAR; INTRAVENOUS; SUBCUTANEOUS EVERY 6 HOURS PRN
Status: DISCONTINUED | OUTPATIENT
Start: 2024-02-07 | End: 2024-02-07 | Stop reason: HOSPADM

## 2024-02-07 RX ORDER — MUPIROCIN 20 MG/G
OINTMENT TOPICAL 2 TIMES DAILY
Status: DISCONTINUED | OUTPATIENT
Start: 2024-02-07 | End: 2024-02-07 | Stop reason: HOSPADM

## 2024-02-07 RX ORDER — HYDROCODONE BITARTRATE AND ACETAMINOPHEN 7.5; 325 MG/1; MG/1
1 TABLET ORAL EVERY 6 HOURS PRN
Qty: 10 TABLET | Refills: 0 | Status: SHIPPED | OUTPATIENT
Start: 2024-02-07 | End: 2024-05-23

## 2024-02-07 RX ORDER — GLYCOPYRROLATE 0.2 MG/ML
0.2 INJECTION INTRAMUSCULAR; INTRAVENOUS
Status: COMPLETED | OUTPATIENT
Start: 2024-02-07 | End: 2024-02-07

## 2024-02-07 RX ORDER — SODIUM CHLORIDE, SODIUM LACTATE, POTASSIUM CHLORIDE, CALCIUM CHLORIDE 600; 310; 30; 20 MG/100ML; MG/100ML; MG/100ML; MG/100ML
INJECTION, SOLUTION INTRAVENOUS CONTINUOUS
Status: DISCONTINUED | OUTPATIENT
Start: 2024-02-07 | End: 2024-02-07 | Stop reason: HOSPADM

## 2024-02-07 RX ADMIN — GLYCOPYRROLATE 0.2 MG: 0.2 INJECTION INTRAMUSCULAR; INTRAVENOUS at 06:02

## 2024-02-07 RX ADMIN — IBUPROFEN 600 MG: 600 TABLET, FILM COATED ORAL at 09:02

## 2024-02-07 RX ADMIN — DEXAMETHASONE SODIUM PHOSPHATE 8 MG: 4 INJECTION, SOLUTION INTRA-ARTICULAR; INTRALESIONAL; INTRAMUSCULAR; INTRAVENOUS; SOFT TISSUE at 06:02

## 2024-02-07 RX ADMIN — VECURONIUM BROMIDE 6 MG: 10 INJECTION, POWDER, FOR SOLUTION INTRAVENOUS at 06:02

## 2024-02-07 RX ADMIN — FENTANYL CITRATE 50 MCG: 50 INJECTION, SOLUTION INTRAMUSCULAR; INTRAVENOUS at 07:02

## 2024-02-07 RX ADMIN — GLYCOPYRROLATE 0.4 MG: 0.2 INJECTION, SOLUTION INTRAMUSCULAR; INTRAVITREAL at 08:02

## 2024-02-07 RX ADMIN — MIDAZOLAM HYDROCHLORIDE 2 MG: 1 INJECTION, SOLUTION INTRAMUSCULAR; INTRAVENOUS at 06:02

## 2024-02-07 RX ADMIN — NEOSTIGMINE METHYLSULFATE 3 MG: 0.5 INJECTION INTRAVENOUS at 08:02

## 2024-02-07 RX ADMIN — SODIUM CHLORIDE, POTASSIUM CHLORIDE, SODIUM LACTATE AND CALCIUM CHLORIDE: 600; 310; 30; 20 INJECTION, SOLUTION INTRAVENOUS at 08:02

## 2024-02-07 RX ADMIN — SODIUM CHLORIDE: 9 INJECTION, SOLUTION INTRAVENOUS at 05:02

## 2024-02-07 RX ADMIN — ONDANSETRON 4 MG: 2 INJECTION INTRAMUSCULAR; INTRAVENOUS at 06:02

## 2024-02-07 RX ADMIN — SODIUM CHLORIDE, POTASSIUM CHLORIDE, SODIUM LACTATE AND CALCIUM CHLORIDE: 600; 310; 30; 20 INJECTION, SOLUTION INTRAVENOUS at 09:02

## 2024-02-07 RX ADMIN — FAMOTIDINE 20 MG: 20 TABLET, FILM COATED ORAL at 05:02

## 2024-02-07 RX ADMIN — CEFAZOLIN 2 G: 2 INJECTION, POWDER, FOR SOLUTION INTRAMUSCULAR; INTRAVENOUS at 06:02

## 2024-02-07 RX ADMIN — FENTANYL CITRATE 50 MCG: 50 INJECTION, SOLUTION INTRAMUSCULAR; INTRAVENOUS at 06:02

## 2024-02-07 RX ADMIN — PROPOFOL 150 MG: 10 INJECTION, EMULSION INTRAVENOUS at 06:02

## 2024-02-07 RX ADMIN — FENTANYL CITRATE 100 MCG: 50 INJECTION, SOLUTION INTRAMUSCULAR; INTRAVENOUS at 06:02

## 2024-02-07 RX ADMIN — LABETALOL HYDROCHLORIDE 5 MG: 5 INJECTION, SOLUTION INTRAVENOUS at 07:02

## 2024-02-07 RX ADMIN — LIDOCAINE HYDROCHLORIDE 50 MG: 20 INJECTION, SOLUTION INTRAVENOUS at 06:02

## 2024-02-07 NOTE — OP NOTE
DATE OF PROCEDURE: 2/7/2024    PRE-OP DIAGNOSIS:  Menorrhagia with regular cycle [N92.0]  Fibroids [D21.9]  Iron deficiency anemia due to chronic blood loss [D50.0]  BMI 33.0-33.9,adult [Z68.33]    POST-OP DIAGNOSIS:  Post-Op Diagnosis Codes:     * Menorrhagia with regular cycle [N92.0]     * Fibroids [D21.9]     * Iron deficiency anemia due to chronic blood loss [D50.0]     * BMI 33.0-33.9,adult [Z68.33]     * Right ovarian cyst [N83.201]    PROCEDURE:   Total Laparoscopic Hysterectomy   Bilateral Salpingo-oophorectomy  Cystoscopy    SURGEON: Leonard Hopkins MD    ASSISTANT: Kylah Rankin NP    ANESTHESIA: General Endotracheal    ESTIMATED BLOOD LOSS: 200 cc    PROCEDURE IN DETAIL:  After consents were reviewed, patient was taken to the operating room where a time-out was held.  She was placed on the OR table, and after induction of anesthesia, placed in the dorsal lithotomy position in Julio stirrups and prepped and draped in standard sterile fashion.      An Advincula  3 cm uterine manipulator was placed in the cervix for uterine manipulation.  Attention was then turned to the abdomen.  A 1 cm incision was made in the skin just below the umbilicus.  The abdomen was tented up and a Veress needle was inserted.  The abdomen was then insufflated to 15 mmHg.  A 5 mm port was placed.  Camera was inserted.  Patient was placed in Trendelenburg position.  Two additional 5 mm ports were then placed to the left and right lower quadrants respectively.  Bowel was mobilized from the pelvis.  Findings included enlarged uterus with several subserosal fibroids, enlarged cystic left adnexa, normal right ovary, otherwise normal pelvic anatomy.      The right round ligament was identified and taken with Enseal device.  The anterior leaf of the right broad ligament was then incised and this dissection was carried around the lower uterine segment through the vesicouterine peritoneal fold.  The bladder was carefully dissected from  the overlying lower uterine segment and cervix exposing the upper vagina.  Enseal was used to isolate, cauterize, and cut the right infundibulopelvic ligament and ovarian vessels.  Subsequent bites were then taken down the remaining right broad ligament until the right uterine artery could be identified isolated and taken.  The dissection was carried down to the upper vagina. This procedure was repeated for the left side.  A clip was placed on a bleeding vessel of the left uterine artery after cautery and cut.  Hemostasis was observed.  Monopolar cautery with a laparoscopic spatula was used to incise the upper vagina exposing the manipulator circumferentially around the cervix.  There uterus, tubes, and ovaries were removed through the vagina.  The cervix was inspected and was removed in its entirety.  A lap sponge was placed in a blue glove and inserted into the vagina to maintain pneumoperitoneum.  The colpotomy was closed with a running 2-0 Stratafix absorbable suture.  The pelvis was irrigated and hemostasis was observed.  Peristalsis of both ureters was noted.  Gas was allowed to expel from the abdomen.  Ports were removed.  Skin was closed with 4-0 Monocryl.      A cystoscope was then inserted into the urethra and advanced into the bladder.  Normal bladder mucosa was noted and efflux was noted from bilateral ureteral orifices.  Cystoscope was removed bladder was drained and attention was returned to the abdomen.      Patient was awakened and taken to the recovery room in stable condition having tolerated the procedure very well.  Sponge lap needle count correct.    I agree with anesthesia's plan of care.

## 2024-02-07 NOTE — PLAN OF CARE
Patient is back to her room in stable condition, No difficulty breathing or swallowing, No s/s of distress noted, Laparoscopic incision x 3 noted to abdomen, Closed with Dermabond, Dry and intact, SCDs in place and pumping, SR up x 2 with call bell in reach, Family member at bedside,

## 2024-02-07 NOTE — PLAN OF CARE
Patient has voided multiple times with minimal bleeding, Tolerated lunch well, No s/s of distress noted, Stable condition, SR up with call bell in reach, Family members at bedside,

## 2024-02-07 NOTE — ANESTHESIA PROCEDURE NOTES
Intubation    Date/Time: 2/7/2024 6:48 AM    Performed by: Osman Poon CRNA  Authorized by: Osman Poon CRNA    Intubation:     Induction:  Intravenous    Intubated:  Postinduction    Mask Ventilation:  Easy mask    Attempts:  1    Attempted By:  CRNA    Method of Intubation:  Direct    Blade:  Oliver 2    Laryngeal View Grade: Grade I - full view of cords      Difficult Airway Encountered?: No      Complications:  None    Airway Device:  Oral endotracheal tube    Airway Device Size:  7.0    Style/Cuff Inflation:  Cuffed    Inflation Amount (mL):  6    Tube secured:  21    Secured at:  The lips    Placement Verified By:  Capnometry    Complicating Factors:  None    Findings Post-Intubation:  BS equal bilateral and atraumatic/condition of teeth unchanged

## 2024-02-07 NOTE — DISCHARGE INSTRUCTIONS
Hysterectomy Postoperative Instructions  Diet: As tolerated   Decreased activity until follow up appointment.  No driving for 2 weeks, or while taking pain medications.   Pelvic rest: no intercourse, tampons, or douching.  No heavy lifting or straining. No pushing or pulling.  Showers only, no tub baths.  Notify MD for:   excessive bright red vaginal bleeding  Fever  pain unrelieved by medication  excessive nausea or vomiting  foul odor or drainage from incision site/vagina  redness or swelling around incision sites.             Exofin  PATIENT AFTER-CARE INSTRUCTIONS          Your wound has been repaired using Exofin® High Viscosity topical tissue adhesive. The list below is a guide for you to understand and care for your wound following your procedure.          1. Keep the wound dry.     You may occasionally and briefly wet your wound in the shower or bath at the direction of your physician, but do not soak or scrub the wound area. After showering or bathing, gently blot your wound dry with a soft towel.       2. Avoid Topical Medications     Do not apply liquid or ointment medications, lotions, creams, petroleum jelly, mineral oils or any other product to your wound while the Exofin® adhesive film is in place.         3. Do not rub, scratch, or pick at the wound.     Doing so may compromise the integrity of the wound closure and cause scaring.        4. Protect the wound from prolonged sunlight exposure.     Do not use tanning lamps while the film is in place.        5. Check wound appearance.      Some swelling, redness, and pain are common with all wounds and normally will go away as the wound heals. If swelling, redness, or pain increases, or if the wound feels warm to the touch, contact your doctor. Also contact your doctor if the wound edges reopen or separate.        6. Exofin® will naturally slough off between 5 and 10 days after the procedure.     By this time, your wound should be sufficiently healed.  This information is not intended as a substitute for the advice of a physician. For more detailed information, talk to your doctor. Your doctor can choose the best treatment for you in your particular circumstances.               For additional questions and concerns, please consult your doctor.

## 2024-02-07 NOTE — PLAN OF CARE
Patient ambulated to restroom with assistance of RN, Voided with minimal bleeding noted, Laparoscopic incision x 3 noted to abdomen are dry and intact, Closed with Dermabond, Ambulated back to bed with assistance of RN, SR up x 2 with call bell in reach, Family members at bedside, IV is patent with IVF infusing, No redness noted to site, SCDs to bilateral lower extremities are on and pumping, Patient states that her pain level has decreased to a 2/10 and she is feeling better,

## 2024-02-07 NOTE — ANESTHESIA POSTPROCEDURE EVALUATION
Anesthesia Post Evaluation    Patient: Efraín Reyes    Procedure(s) Performed: Procedure(s) (LRB):  HYSTERECTOMY,TOTAL,LAPAROSCOPIC,WITH SALPINGO-OOPHORECTOMY (N/A)    Final Anesthesia Type: general      Patient location during evaluation: PACU  Patient participation: Yes- Able to Participate  Level of consciousness: awake and alert, awake and oriented  Post-procedure vital signs: reviewed and stable  Pain management: adequate  Airway patency: patent    PONV status at discharge: No PONV  Anesthetic complications: no      Cardiovascular status: blood pressure returned to baseline  Respiratory status: unassisted, room air and spontaneous ventilation  Hydration status: euvolemic  Follow-up not needed.              Vitals Value Taken Time   /82 02/07/24 0824   Temp 36.2 °C (97.1 °F) 02/07/24 0823   Pulse 68 02/07/24 0824   Resp 18 02/07/24 0823   SpO2 100 % 02/07/24 0823   Vitals shown include unvalidated device data.      No case tracking events are documented in the log.      Pain/Jacek Score: No data recorded

## 2024-02-07 NOTE — PLAN OF CARE
Patient is drinking water and her liquid breakfast tray, Toelrating it well, No s/s of distress noted, Stable condition

## 2024-02-07 NOTE — PLAN OF CARE
Laparoscopic incision x 3 noted to abdomen are dry and intact, Closed with Dermabond, Discharge instructions given, Allowed time for questions, Verbalized understanding,

## 2024-02-07 NOTE — BRIEF OP NOTE
[facility]  [facility]    Surgery Date: 2/7/2024     Surgeon(s) and Role:     * Leonard Hopkins MD - Primary    Assisting Surgeon: None    Pre-op Diagnosis:  Menorrhagia with regular cycle [N92.0]  Fibroids [D21.9]  Iron deficiency anemia due to chronic blood loss [D50.0]  BMI 33.0-33.9,adult [Z68.33]    Post-op Diagnosis:  Post-Op Diagnosis Codes:     * Menorrhagia with regular cycle [N92.0]     * Fibroids [D21.9]     * Iron deficiency anemia due to chronic blood loss [D50.0]     * BMI 33.0-33.9,adult [Z68.33]     * Right ovarian cyst [N83.201]    Procedure(s) (LRB):  HYSTERECTOMY,TOTAL,LAPAROSCOPIC,WITH SALPINGO-OOPHORECTOMY (N/A)    Anesthesia: General    Operative Findings: see op note    Estimated Blood Loss: * No values recorded between 2/7/2024  7:14 AM and 2/7/2024  8:23 AM *         Specimens:   Specimen (24h ago, onward)       Start     Ordered    02/07/24 0752  Specimen to Pathology Gynecology and Obstetrics  Once        References:    Click here for ordering Quick Tip   Question Answer Comment   Procedure Type: Gynecology and Obstetrics    Specimen Source Uterus Uterus, bilateral fallopian tubes, bilateral ovaries   Clinical Information: Menorrhagia with regular cycle, fibroids, iron-deficiency anemia    Release to patient Immediate        02/07/24 0752    02/07/24 0749  Specimen to Pathology  RELEASE UPON ORDERING        References:    Click here for ordering Quick Tip   Question:  Release to patient  Answer:  Immediate    02/07/24 0749                      Discharge Note    OUTCOME: Patient tolerated treatment/procedure well without complication and is now ready for discharge.    DISPOSITION: Home or Self Care    FINAL DIAGNOSIS:  Status post laparoscopic hysterectomy    FOLLOWUP: In clinic    DISCHARGE INSTRUCTIONS:    Discharge Procedure Orders   Diet Adult Regular     Lifting restrictions     No driving until:   Order Comments: Off opiods     Pelvic Rest     No  dressing needed     Notify your health care provider if you experience any of the following:  temperature >100.4     Notify your health care provider if you experience any of the following:  persistent nausea and vomiting or diarrhea     Notify your health care provider if you experience any of the following:  severe uncontrolled pain     Notify your health care provider if you experience any of the following:  redness, tenderness, or signs of infection (pain, swelling, redness, odor or green/yellow discharge around incision site)     Notify your health care provider if you experience any of the following:  difficulty breathing or increased cough     Notify your health care provider if you experience any of the following:  severe persistent headache     Notify your health care provider if you experience any of the following:  worsening rash     Notify your health care provider if you experience any of the following:  persistent dizziness, light-headedness, or visual disturbances     Notify your health care provider if you experience any of the following:  increased confusion or weakness     Activity as tolerated     Shower on day dressing removed (No bath)

## 2024-02-07 NOTE — PLAN OF CARE
Notified Dr. Hopkins of patient's progress throughout the day and of post-op H&H results, States that patient can be discharged home at this time,

## 2024-02-08 VITALS
WEIGHT: 215 LBS | OXYGEN SATURATION: 100 % | HEART RATE: 84 BPM | SYSTOLIC BLOOD PRESSURE: 134 MMHG | DIASTOLIC BLOOD PRESSURE: 73 MMHG | BODY MASS INDEX: 32.69 KG/M2 | TEMPERATURE: 97 F | RESPIRATION RATE: 18 BRPM

## 2024-02-28 ENCOUNTER — LAB VISIT (OUTPATIENT)
Dept: LAB | Facility: HOSPITAL | Age: 49
End: 2024-02-28
Payer: COMMERCIAL

## 2024-02-28 ENCOUNTER — OFFICE VISIT (OUTPATIENT)
Dept: HEMATOLOGY/ONCOLOGY | Facility: CLINIC | Age: 49
End: 2024-02-28
Payer: COMMERCIAL

## 2024-02-28 VITALS
HEART RATE: 91 BPM | BODY MASS INDEX: 33.65 KG/M2 | WEIGHT: 222 LBS | DIASTOLIC BLOOD PRESSURE: 82 MMHG | TEMPERATURE: 98 F | SYSTOLIC BLOOD PRESSURE: 139 MMHG | RESPIRATION RATE: 18 BRPM | OXYGEN SATURATION: 98 % | HEIGHT: 68 IN

## 2024-02-28 DIAGNOSIS — Z86.018 HISTORY OF UTERINE FIBROID: ICD-10-CM

## 2024-02-28 DIAGNOSIS — D70.0 CONGENITAL NEUTROPENIA: ICD-10-CM

## 2024-02-28 DIAGNOSIS — D70.9 NEUTROPENIA, UNSPECIFIED TYPE: ICD-10-CM

## 2024-02-28 DIAGNOSIS — D50.0 IRON DEFICIENCY ANEMIA DUE TO CHRONIC BLOOD LOSS: Primary | ICD-10-CM

## 2024-02-28 DIAGNOSIS — D70.9 NEUTROPENIA, UNSPECIFIED TYPE: Primary | ICD-10-CM

## 2024-02-28 LAB
BASOPHILS # BLD AUTO: 0.03 X10(3)/MCL
BASOPHILS NFR BLD AUTO: 0.9 %
EOSINOPHIL # BLD AUTO: 0.12 X10(3)/MCL (ref 0–0.9)
EOSINOPHIL NFR BLD AUTO: 3.7 %
ERYTHROCYTE [DISTWIDTH] IN BLOOD BY AUTOMATED COUNT: 14.3 % (ref 11.5–17)
FERRITIN SERPL-MCNC: 40.44 NG/ML (ref 4.63–204)
HCT VFR BLD AUTO: 34.1 % (ref 37–47)
HGB BLD-MCNC: 10.6 G/DL (ref 12–16)
IMM GRANULOCYTES # BLD AUTO: 0 X10(3)/MCL (ref 0–0.04)
IMM GRANULOCYTES NFR BLD AUTO: 0 %
IRON SATN MFR SERPL: 25 % (ref 20–50)
IRON SERPL-MCNC: 71 UG/DL (ref 50–170)
LYMPHOCYTES # BLD AUTO: 1.15 X10(3)/MCL (ref 0.6–4.6)
LYMPHOCYTES NFR BLD AUTO: 35.8 %
MCH RBC QN AUTO: 27.6 PG (ref 27–31)
MCHC RBC AUTO-ENTMCNC: 31.1 G/DL (ref 33–36)
MCV RBC AUTO: 88.8 FL (ref 80–94)
MONOCYTES # BLD AUTO: 0.28 X10(3)/MCL (ref 0.1–1.3)
MONOCYTES NFR BLD AUTO: 8.7 %
NEUTROPHILS # BLD AUTO: 1.63 X10(3)/MCL (ref 2.1–9.2)
NEUTROPHILS NFR BLD AUTO: 50.9 %
PLATELET # BLD AUTO: 233 X10(3)/MCL (ref 130–400)
PMV BLD AUTO: 10.8 FL (ref 7.4–10.4)
RBC # BLD AUTO: 3.84 X10(6)/MCL (ref 4.2–5.4)
RET# (OHS): 0.04 X10E6/UL (ref 0.02–0.08)
RETICULOCYTE COUNT AUTOMATED (OLG): 1.06 % (ref 1.1–2.1)
TIBC SERPL-MCNC: 216 UG/DL (ref 70–310)
TIBC SERPL-MCNC: 287 UG/DL (ref 250–450)
WBC # SPEC AUTO: 3.21 X10(3)/MCL (ref 4.5–11.5)

## 2024-02-28 PROCEDURE — 85045 AUTOMATED RETICULOCYTE COUNT: CPT

## 2024-02-28 PROCEDURE — 3079F DIAST BP 80-89 MM HG: CPT | Mod: CPTII,S$GLB,, | Performed by: INTERNAL MEDICINE

## 2024-02-28 PROCEDURE — 85025 COMPLETE CBC W/AUTO DIFF WBC: CPT

## 2024-02-28 PROCEDURE — 3008F BODY MASS INDEX DOCD: CPT | Mod: CPTII,S$GLB,, | Performed by: INTERNAL MEDICINE

## 2024-02-28 PROCEDURE — 99213 OFFICE O/P EST LOW 20 MIN: CPT | Mod: S$GLB,,, | Performed by: INTERNAL MEDICINE

## 2024-02-28 PROCEDURE — 99999 PR PBB SHADOW E&M-EST. PATIENT-LVL IV: CPT | Mod: PBBFAC,,, | Performed by: INTERNAL MEDICINE

## 2024-02-28 PROCEDURE — 83540 ASSAY OF IRON: CPT

## 2024-02-28 PROCEDURE — 3075F SYST BP GE 130 - 139MM HG: CPT | Mod: CPTII,S$GLB,, | Performed by: INTERNAL MEDICINE

## 2024-02-28 PROCEDURE — 36415 COLL VENOUS BLD VENIPUNCTURE: CPT

## 2024-02-28 PROCEDURE — 82668 ASSAY OF ERYTHROPOIETIN: CPT

## 2024-02-28 PROCEDURE — 1159F MED LIST DOCD IN RCRD: CPT | Mod: CPTII,S$GLB,, | Performed by: INTERNAL MEDICINE

## 2024-02-28 PROCEDURE — 82728 ASSAY OF FERRITIN: CPT

## 2024-02-28 RX ORDER — GABAPENTIN 100 MG/1
100 CAPSULE ORAL 3 TIMES DAILY
COMMUNITY
Start: 2024-02-19

## 2024-02-28 NOTE — PROGRESS NOTES
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 She sees Dr. Schmidt and was referred to another GYN for hysterectomy who did not take her insurance. Patient has a long standing history of neutropenia dating back to her 20s. Documented Neutropenia in available labs dating back at least 7 years. Patient is actively taking oral iron 85mg of elemental iron, folate B12 and other vitamins. She has received IV iron infusions last year and the year before at [facility] area.  She does not have frequent infections.   LABS:    24 WBC 3.21, hgb 10.6,ferritin 40, iron 71, sat25  23 WBC 3.33, HGB 11.0, Iron 102, ferritin 33, sed Rate 19, CRP <1, quantitative immunoglobulins normal, free light chains normal, SPEP no M-spike, Hep and HIV nonreactive.   10/25/23 :  WBC 2.95, ANC 1.5, HGB 10.2, HCT 32.5, MCV 88, PLT 231K  10/2/23 WBC 2.88, HGB 11.14, HCT 34.8, MCV 90, GQV144, iron 84, iron sat 28, folate > 20, ferritin 41, B12 771, CMP WNL except total bili 1.4retic 1.55  22 WBC 3.4, ANC   21 WBC 4.8, ANC 2.07 WBC 3.8, AC 2.0  IMAGIN23 pelvic US:  Uterus: The uterus measures 8.0 x 5.3 x 8.8 cm with multiple (at least 3) benign-appearing intramural fibroids measuring as large as 4 cm in greatest diameter.  The endometrial stripe is thickened measuring 1.5 cm in AP thickness on transvaginal imaging.   Ovaries: The right ovary measures 3.5 x 2.3 x 2.4 cm and the left ovary measures 2.2 x 1.1 x 1.9 cm with benign-appearing follicles originating from both ovaries and 2  small (1.6 cm or less), moderately tender, benign-appearing, simple cysts noted originating from the right ovary.  11/2023 abd US:  unremarkable.   Subjective:   Interval History: Ms. Reyes is a 48 y.o. female who returns for new evaluation and treatment of. neutropenia   INTERVAL HISTORY:  2/28/24:  She had KAI BSO on 2/7/24 and is feeling a lot better.     Past Medical History:   Past Medical History:   Diagnosis Date    Anemia     Benign neoplasm of nerve sheath     Cervical pain     Lumbar radiculopathy     Muscle spasticity     Muscle wasting and atrophy, not elsewhere classified, other site     Neutropenia, unspecified type     Thoracic back pain    Uterine fibroids    Past Surgical HIstory:   Past Surgical History:   Procedure Laterality Date    C6-T1 LAMINECTOMIES W/ EXCISION OF RIGHT C8 SCHWANNOMA  05/01/2020    Dr. Orlando    ENDOMETRIAL BIOPSY  2023    HYSTERECTOMY  02/07/2024    HYSTERECTOMY, TOTAL, LAPAROSCOPIC, WITH SALPINGO-OOPHORECTOMY N/A 02/07/2024    Procedure: HYSTERECTOMY,TOTAL,LAPAROSCOPIC,WITH SALPINGO-OOPHORECTOMY;  Surgeon: Leonard Hopkins MD;  Location: [facility];  Service: OB/GYN;  Laterality: N/A;       Family History:   Family History   Problem Relation Age of Onset    Heart disease Mother     Hyperlipidemia Mother     Kidney disease Mother     Peripheral vascular disease Mother     Diabetes Mother     Stroke Father     Heart disease Father     Hyperlipidemia Father     Breast cancer Neg Hx     Colon cancer Neg Hx     Ovarian cancer Neg Hx     Uterine cancer Neg Hx     Cervical cancer Neg Hx        Social History:  reports that she has never smoked. She has never been exposed to tobacco smoke. She has never used smokeless tobacco. She reports current alcohol use. She reports that she does not use drugs.    Allergies:  Review of patient's allergies indicates:  No Known Allergies    Medications:  Current Outpatient Medications   Medication Sig Dispense Refill    ascorbic acid, vitamin C,  "(VITAMIN C) 1000 MG tablet Take 1 tablet by mouth once daily.      CELEXA 10 mg tablet Take 10 mg by mouth once daily.      cholecalciferol, vitamin D3, 125 mcg (5,000 unit) capsule Take 1 capsule by mouth once daily.      co-enzyme Q-10 30 mg capsule Take 30 mg by mouth 3 (three) times daily.      cyanocobalamin 500 MCG tablet Take 500 mcg by mouth 2 (two) times a day.      cyclobenzaprine (FLEXERIL) 10 MG tablet Take 10 mg by mouth 2 (two) times daily as needed.      eszopiclone (LUNESTA) 3 mg Tab Take 3 mg by mouth nightly as needed.      ferrous sulfate 325 (65 FE) MG EC tablet Take 325 mg by mouth once daily.      folic acid (FOLVITE) 800 MCG Tab Take 800 mcg by mouth once daily.      gabapentin (NEURONTIN) 100 MG capsule Take 100 mg by mouth 3 (three) times daily.      HYDROcodone-acetaminophen (NORCO) 7.5-325 mg per tablet Take 1 tablet by mouth every 6 (six) hours as needed for Pain. 10 tablet 0    ibuprofen 20 mg/mL oral liquid Take 30 mLs (600 mg total) by mouth every 6 (six) hours as needed for Pain. 480 mL 2    iron-vitamin C 100-250 mg, ICAR-C, (ICAR-C) 100-250 mg Tab Take 1 tablet by mouth 2 (two) times a day. 60 tablet 3    meloxicam (MOBIC) 7.5 MG tablet Take 7.5 mg by mouth once daily.      vitamin A 50731 UNIT capsule Take 10,000 Units by mouth once daily.      vitamin E 400 UNIT capsule Take 400 Units by mouth once daily.      zinc acetate 50 mg (zinc) Cap Take 1 capsule by mouth once daily.       No current facility-administered medications for this visit.       Review of Systems   All other systems reviewed and are negative.      ECOG Performance Status:   ECOG SCORE             Objective:      Vitals:   Vitals:    02/28/24 1410   BP: 139/82   Pulse: 91   Resp: 18   Temp: 97.9 °F (36.6 °C)   SpO2: 98%   Weight: 100.7 kg (222 lb)   Height: 5' 7.99" (1.727 m)       BMI: Body mass index is 33.76 kg/m².    Physical Exam  Constitutional:       Appearance: Normal appearance. She is normal weight. "   HENT:      Head: Normocephalic and atraumatic.   Eyes:      Extraocular Movements: Extraocular movements intact.      Conjunctiva/sclera: Conjunctivae normal.   Cardiovascular:      Rate and Rhythm: Normal rate and regular rhythm.   Pulmonary:      Effort: Pulmonary effort is normal.      Breath sounds: Normal breath sounds.   Abdominal:      General: Abdomen is flat. Bowel sounds are normal.      Palpations: Abdomen is soft.   Musculoskeletal:         General: Normal range of motion.      Cervical back: Normal range of motion and neck supple.   Skin:     General: Skin is warm and dry.   Neurological:      General: No focal deficit present.      Mental Status: She is alert and oriented to person, place, and time.   Psychiatric:         Mood and Affect: Mood normal.         Behavior: Behavior normal.       Laboratory Data:  Lab Visit on 02/28/2024   Component Date Value Ref Range Status    Iron Binding Capacity Unsaturated 02/28/2024 216  70 - 310 ug/dL Final    Iron Level 02/28/2024 71  50 - 170 ug/dL Final    Iron Binding Capacity Total 02/28/2024 287  250 - 450 ug/dL Final    Iron Saturation 02/28/2024 25  20 - 50 % Final    Ferritin Level 02/28/2024 40.44  4.63 - 204.00 ng/mL Final    Retic Cnt Auto 02/28/2024 1.06 (L)  1.1 - 2.1 % Final    RET# 02/28/2024 0.0407  0.016 - 0.078 x10e6/uL Final    Erythropoietin (EPO), S 02/28/2024 18.5  2.6 - 18.5 mIU/mL Final       Test Performed by:  [place] [address]  : Landon Slater M.D. Ph.D.; CLIA# [id number]    WBC 02/28/2024 3.21 (L)  4.50 - 11.50 x10(3)/mcL Final    RBC 02/28/2024 3.84 (L)  4.20 - 5.40 x10(6)/mcL Final    Hgb 02/28/2024 10.6 (L)  12.0 - 16.0 g/dL Final    Hct 02/28/2024 34.1 (L)  37.0 - 47.0 % Final    MCV 02/28/2024 88.8  80.0 - 94.0 fL Final    MCH 02/28/2024 27.6  27.0 - 31.0 pg Final    MCHC 02/28/2024 31.1 (L)  33.0 - 36.0 g/dL Final    RDW 02/28/2024  14.3  11.5 - 17.0 % Final    Platelet 02/28/2024 233  130 - 400 x10(3)/mcL Final    MPV 02/28/2024 10.8 (H)  7.4 - 10.4 fL Final    Neut % 02/28/2024 50.9  % Final    Lymph % 02/28/2024 35.8  % Final    Mono % 02/28/2024 8.7  % Final    Eos % 02/28/2024 3.7  % Final    Basophil % 02/28/2024 0.9  % Final    Lymph # 02/28/2024 1.15  0.6 - 4.6 x10(3)/mcL Final    Neut # 02/28/2024 1.63 (L)  2.1 - 9.2 x10(3)/mcL Final    Mono # 02/28/2024 0.28  0.1 - 1.3 x10(3)/mcL Final    Eos # 02/28/2024 0.12  0 - 0.9 x10(3)/mcL Final    Baso # 02/28/2024 0.03  <=0.2 x10(3)/mcL Final    IG# 02/28/2024 0.00  0 - 0.04 x10(3)/mcL Final    IG% 02/28/2024 0.0  % Final         Imaging:   Assessment:     1. Iron deficiency anemia due to chronic blood loss    2. Congenital neutropenia    3. History of uterine fibroid        Plan:   Work up with hepatitis panel, HIV copper, ERLIN, ESR, CRP, Complete abdominal US, path review of peripheral smear and myeloma workup all normal. Already had a normal B12 and folate.   She was given  40 mg dose of prednisone and WBC demarginated and increased to 5.1   History of iron deficiency due to heavy periods secondary to fibroids. Has received IV iron in 2022 and 2021, repeat iron studies are normal though ferritin is only 33 on on 85 mg of elemental iron a day.She has had her hysterectomy so will continue oral iron until ferritin is around 100 and then will dc.   Problem List Items Addressed This Visit          Oncology    Iron deficiency anemia, unspecified - Primary     Other Visit Diagnoses       Congenital neutropenia        History of uterine fibroid              No further work up required for neutropenia. Will trend her iron levels until ferritin is about 100 now that she has had a hysterectomy. RTC in 3 months with CBC and iron studies. .

## 2024-02-29 LAB — EPO SERPL-ACNC: 18.5 MIU/ML (ref 2.6–18.5)

## 2024-03-20 NOTE — PROGRESS NOTES
HPI:   48 y.o. F s/p [facility] BSO on 2/7/2024 due to menorrhagia to anemia, uterine fibroids here for postop visit. C/o hot flashes, worse at night. Denies hx of breast CA, DVT currently on Celexa 10mg for anxiety, depression.     2/7/24 Pathology:   Uterus, bilateral fallopian tubes, bilateral ovaries:  Leiomyomas of uterus, benign  Adenomyosis of uterus, benign   Secretory endometrium, benign   Uterine cervix, benign   Ovaries, bilateral, benign  Follicular salpingitis, bilateral, benign     Physical Exam:   LMP 01/19/2024   Gen: NAD  Abd: Soft, NT.  Wounds healing well.  No sign of infection.  Pelvis: NEFG. Vagina normal. Cuff healing well.     Assessment:   1. Postoperative examination    2. Status post laparoscopic hysterectomy    3. Hot flashes due to surgical menopause    Hysterectomy,total,laparoscopic,with Salpingo-oophorectomy       Plan:   Reviewed pathology  May return to normal activity    Discussed hot flashes, menopausal symptoms and hormone replacement therapy.  Discussed risks of HRT including but not limited to: Deep vein thrombosis, pulmonary emboli, stroke, increased cancer risk, etc.  Also discussed alternatives such as paroxetine, clonidine, or Veozah.    Reviewed self-help strategies (dietary changes/exercise/accupuncture/etc.), herbal and other OTC therapies, hormonal options as well as other medications used to treat common menopausal symptoms. Layered clothing, fans.     Pt desires to conservative management at this time. Will call office if desires HRT.     RTC PRN/Annual         This note was transcribed by Iris Frank. There may be transcription errors as a result, however minimal. Effort has been made to ensure accuracy of transcription, but any obvious errors or omissions should be clarified with the author of the document.

## 2024-03-21 ENCOUNTER — OFFICE VISIT (OUTPATIENT)
Dept: OBSTETRICS AND GYNECOLOGY | Facility: CLINIC | Age: 49
End: 2024-03-21
Payer: COMMERCIAL

## 2024-03-21 VITALS
SYSTOLIC BLOOD PRESSURE: 130 MMHG | HEIGHT: 68 IN | WEIGHT: 224 LBS | DIASTOLIC BLOOD PRESSURE: 74 MMHG | BODY MASS INDEX: 33.95 KG/M2

## 2024-03-21 DIAGNOSIS — E89.41 HOT FLASHES DUE TO SURGICAL MENOPAUSE: ICD-10-CM

## 2024-03-21 DIAGNOSIS — Z09 POSTOPERATIVE EXAMINATION: Primary | ICD-10-CM

## 2024-03-21 DIAGNOSIS — Z90.710 STATUS POST LAPAROSCOPIC HYSTERECTOMY: ICD-10-CM

## 2024-03-21 PROBLEM — D21.9 FIBROIDS: Status: RESOLVED | Noted: 2024-01-09 | Resolved: 2024-03-21

## 2024-03-21 PROBLEM — N92.0 MENORRHAGIA WITH REGULAR CYCLE: Status: RESOLVED | Noted: 2024-01-09 | Resolved: 2024-03-21

## 2024-03-21 PROCEDURE — 3075F SYST BP GE 130 - 139MM HG: CPT | Mod: CPTII,,, | Performed by: OBSTETRICS & GYNECOLOGY

## 2024-03-21 PROCEDURE — 99024 POSTOP FOLLOW-UP VISIT: CPT | Mod: ,,, | Performed by: OBSTETRICS & GYNECOLOGY

## 2024-03-21 PROCEDURE — 1159F MED LIST DOCD IN RCRD: CPT | Mod: CPTII,,, | Performed by: OBSTETRICS & GYNECOLOGY

## 2024-03-21 PROCEDURE — 3078F DIAST BP <80 MM HG: CPT | Mod: CPTII,,, | Performed by: OBSTETRICS & GYNECOLOGY

## 2024-03-28 ENCOUNTER — LAB VISIT (OUTPATIENT)
Dept: LAB | Facility: HOSPITAL | Age: 49
End: 2024-03-28
Attending: NURSE PRACTITIONER
Payer: COMMERCIAL

## 2024-03-28 DIAGNOSIS — Z79.899 ENCOUNTER FOR LONG-TERM (CURRENT) USE OF OTHER MEDICATIONS: Primary | ICD-10-CM

## 2024-03-28 LAB
ALBUMIN SERPL-MCNC: 3.8 G/DL (ref 3.5–5)
ALBUMIN/GLOB SERPL: 1.1 RATIO (ref 1.1–2)
ALP SERPL-CCNC: 80 UNIT/L (ref 40–150)
ALT SERPL-CCNC: 10 UNIT/L (ref 0–55)
AMPHET UR QL SCN: NEGATIVE
AST SERPL-CCNC: 16 UNIT/L (ref 5–34)
BARBITURATE SCN PRESENT UR: NEGATIVE
BASOPHILS # BLD AUTO: 0.02 X10(3)/MCL
BASOPHILS NFR BLD AUTO: 0.7 %
BENZODIAZ UR QL SCN: NEGATIVE
BILIRUB SERPL-MCNC: 0.9 MG/DL
BUN SERPL-MCNC: 16 MG/DL (ref 7–18.7)
CALCIUM SERPL-MCNC: 8.8 MG/DL (ref 8.4–10.2)
CANNABINOIDS UR QL SCN: NEGATIVE
CHLORIDE SERPL-SCNC: 111 MMOL/L (ref 98–107)
CHOLEST SERPL-MCNC: 148 MG/DL
CHOLEST/HDLC SERPL: 2 {RATIO} (ref 0–5)
CO2 SERPL-SCNC: 25 MMOL/L (ref 22–29)
COCAINE UR QL SCN: NEGATIVE
CREAT SERPL-MCNC: 0.83 MG/DL (ref 0.55–1.02)
EOSINOPHIL # BLD AUTO: 0.15 X10(3)/MCL (ref 0–0.9)
EOSINOPHIL NFR BLD AUTO: 5.1 %
ERYTHROCYTE [DISTWIDTH] IN BLOOD BY AUTOMATED COUNT: 13.8 % (ref 11.5–17)
EST. AVERAGE GLUCOSE BLD GHB EST-MCNC: 114 MG/DL
FENTANYL UR QL SCN: NEGATIVE
GFR SERPLBLD CREATININE-BSD FMLA CKD-EPI: >60 MLS/MIN/1.73/M2
GLOBULIN SER-MCNC: 3.5 GM/DL (ref 2.4–3.5)
GLUCOSE SERPL-MCNC: 96 MG/DL (ref 74–100)
HBA1C MFR BLD: 5.6 %
HCT VFR BLD AUTO: 33.1 % (ref 37–47)
HDLC SERPL-MCNC: 75 MG/DL (ref 35–60)
HGB BLD-MCNC: 10.4 G/DL (ref 12–16)
IMM GRANULOCYTES # BLD AUTO: 0 X10(3)/MCL (ref 0–0.04)
IMM GRANULOCYTES NFR BLD AUTO: 0 %
LDLC SERPL CALC-MCNC: 64 MG/DL (ref 50–140)
LYMPHOCYTES # BLD AUTO: 1.31 X10(3)/MCL (ref 0.6–4.6)
LYMPHOCYTES NFR BLD AUTO: 44.3 %
MCH RBC QN AUTO: 27.7 PG (ref 27–31)
MCHC RBC AUTO-ENTMCNC: 31.4 G/DL (ref 33–36)
MCV RBC AUTO: 88 FL (ref 80–94)
MDMA UR QL SCN: NEGATIVE
MONOCYTES # BLD AUTO: 0.26 X10(3)/MCL (ref 0.1–1.3)
MONOCYTES NFR BLD AUTO: 8.8 %
NEUTROPHILS # BLD AUTO: 1.22 X10(3)/MCL (ref 2.1–9.2)
NEUTROPHILS NFR BLD AUTO: 41.1 %
NRBC BLD AUTO-RTO: 0 %
OPIATES UR QL SCN: NEGATIVE
PCP UR QL: NEGATIVE
PH UR: 6.5 [PH] (ref 3–11)
PLATELET # BLD AUTO: 226 X10(3)/MCL (ref 130–400)
PMV BLD AUTO: 11.2 FL (ref 7.4–10.4)
POTASSIUM SERPL-SCNC: 3.8 MMOL/L (ref 3.5–5.1)
PROT SERPL-MCNC: 7.3 GM/DL (ref 6.4–8.3)
RBC # BLD AUTO: 3.76 X10(6)/MCL (ref 4.2–5.4)
SODIUM SERPL-SCNC: 140 MMOL/L (ref 136–145)
SPECIFIC GRAVITY, URINE AUTO (.000) (OHS): 1.02 (ref 1–1.03)
TRIGL SERPL-MCNC: 47 MG/DL (ref 37–140)
TSH SERPL-ACNC: 1.16 UIU/ML (ref 0.35–4.94)
VLDLC SERPL CALC-MCNC: 9 MG/DL
WBC # SPEC AUTO: 2.96 X10(3)/MCL (ref 4.5–11.5)

## 2024-03-28 PROCEDURE — 84443 ASSAY THYROID STIM HORMONE: CPT

## 2024-03-28 PROCEDURE — 80061 LIPID PANEL: CPT

## 2024-03-28 PROCEDURE — 85025 COMPLETE CBC W/AUTO DIFF WBC: CPT

## 2024-03-28 PROCEDURE — 80053 COMPREHEN METABOLIC PANEL: CPT

## 2024-03-28 PROCEDURE — 80365 DRUG SCREENING OXYCODONE: CPT

## 2024-03-28 PROCEDURE — 80307 DRUG TEST PRSMV CHEM ANLYZR: CPT

## 2024-03-28 PROCEDURE — 83036 HEMOGLOBIN GLYCOSYLATED A1C: CPT

## 2024-03-28 PROCEDURE — 36415 COLL VENOUS BLD VENIPUNCTURE: CPT

## 2024-03-29 LAB — MAYO GENERIC ORDERABLE RESULT: NORMAL

## 2024-04-11 ENCOUNTER — LAB VISIT (OUTPATIENT)
Dept: LAB | Facility: HOSPITAL | Age: 49
End: 2024-04-11
Attending: NURSE PRACTITIONER
Payer: COMMERCIAL

## 2024-04-11 DIAGNOSIS — D64.9 ANEMIA, UNSPECIFIED TYPE: Primary | ICD-10-CM

## 2024-04-11 DIAGNOSIS — D70.9 NEUTROPENIA: ICD-10-CM

## 2024-04-11 LAB
ERYTHROCYTE [DISTWIDTH] IN BLOOD BY AUTOMATED COUNT: 13.7 % (ref 11.5–17)
FERRITIN SERPL-MCNC: 29.87 NG/ML (ref 4.63–204)
HCT VFR BLD AUTO: 36.9 % (ref 37–47)
HGB BLD-MCNC: 11.7 G/DL (ref 12–16)
IRON SATN MFR SERPL: 18 % (ref 20–50)
IRON SERPL-MCNC: 53 UG/DL (ref 50–170)
MCH RBC QN AUTO: 27.8 PG (ref 27–31)
MCHC RBC AUTO-ENTMCNC: 31.7 G/DL (ref 33–36)
MCV RBC AUTO: 87.6 FL (ref 80–94)
PLATELET # BLD AUTO: 254 X10(3)/MCL (ref 130–400)
PMV BLD AUTO: 10.3 FL (ref 7.4–10.4)
RBC # BLD AUTO: 4.21 X10(6)/MCL (ref 4.2–5.4)
RET# (OHS): 0.06 X10E6/UL (ref 0.02–0.08)
RETICULOCYTE COUNT AUTOMATED (OLG): 1.33 % (ref 1.1–2.1)
TIBC SERPL-MCNC: 249 UG/DL (ref 70–310)
TIBC SERPL-MCNC: 302 UG/DL (ref 250–450)
TRANSFERRIN SERPL-MCNC: 272 MG/DL (ref 180–382)
VIT B12 SERPL-MCNC: 626 PG/ML (ref 213–816)
WBC # SPEC AUTO: 3.57 X10(3)/MCL (ref 4.5–11.5)

## 2024-04-11 PROCEDURE — 36415 COLL VENOUS BLD VENIPUNCTURE: CPT

## 2024-04-11 PROCEDURE — 84466 ASSAY OF TRANSFERRIN: CPT

## 2024-04-11 PROCEDURE — 82728 ASSAY OF FERRITIN: CPT

## 2024-04-11 PROCEDURE — 83540 ASSAY OF IRON: CPT

## 2024-04-11 PROCEDURE — 82607 VITAMIN B-12: CPT

## 2024-04-11 PROCEDURE — 85027 COMPLETE CBC AUTOMATED: CPT

## 2024-04-11 PROCEDURE — 85045 AUTOMATED RETICULOCYTE COUNT: CPT

## 2024-05-22 NOTE — PROGRESS NOTES
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 She sees Dr. Schmidt and was referred to another GYN for hysterectomy who did not take her insurance. Patient has a long standing history of neutropenia dating back to her 20s. Documented Neutropenia in available labs dating back at least 7 years. Patient is actively taking oral iron 85mg of elemental iron, folate B12 and other vitamins. She has received IV iron infusions last year and the year before at [facility] [place] area.  She does not have frequent infections.     LABS:    24 WBC 3.87, HGB 11.2, HCT 35.4, MCV 86, PLT 202K, ferritin 45, iron 75, iron sat 25%  24 WBC 3.21, hgb 10.6,ferritin 40, iron 71, sat 25  23 WBC 3.33, HGB 11.0, Iron 102, ferritin 33, sed Rate 19, CRP <1, quantitative immunoglobulins normal, free light chains normal, SPEP no M-spike, Hep and HIV nonreactive.   10/25/23 :  WBC 2.95, ANC 1.5, HGB 10.2, HCT 32.5, MCV 88, PLT 231K  10/2/23 WBC 2.88, HGB 11.14, HCT 34.8, MCV 90, EJN081, iron 84, iron sat 28, folate > 20, ferritin 41, B12 771, CMP WNL except total bili 1.4retic 1.55  22 WBC 3.4, ANC   21 WBC 4.8, ANC 2.21 WBC 3.8, AC 2.0    IMAGIN23 pelvic US:  Uterus: The uterus measures 8.0 x 5.3 x 8.8 cm with multiple (at least 3) benign-appearing intramural fibroids measuring as large as 4 cm in greatest diameter.  The endometrial stripe is thickened measuring 1.5 cm in AP thickness on transvaginal imaging.   Ovaries: The right ovary measures 3.5 x 2.3 x 2.4 cm and the  left ovary measures 2.2 x 1.1 x 1.9 cm with benign-appearing follicles originating from both ovaries and 2 small (1.6 cm or less), moderately tender, benign-appearing, simple cysts noted originating from the right ovary.  11/2023 abd US:  unremarkable.   Subjective:   Interval History: Ms. Reyes is a 48 y.o. female who returns for new evaluation and treatment of. neutropenia   INTERVAL HISTORY:  5/23/24 feels like she could run a mile. Her pain from the surgery is abating. Still taking iron every day but forgets twice a week.   2/28/24:  She had KAI BSO on 2/7/24 and is feeling a lot better.     Past Medical History:   Past Medical History:   Diagnosis Date    Anemia     Benign neoplasm of nerve sheath     Cervical pain     Lumbar radiculopathy     Muscle spasticity     Muscle wasting and atrophy, not elsewhere classified, other site     Neutropenia, unspecified type     Thoracic back pain    Uterine fibroids    Past Surgical HIstory:   Past Surgical History:   Procedure Laterality Date    C6-T1 LAMINECTOMIES W/ EXCISION OF RIGHT C8 SCHWANNOMA  05/01/2020    Dr. Orlando    ENDOMETRIAL BIOPSY  2023    HYSTERECTOMY  02/07/2024    HYSTERECTOMY, TOTAL, LAPAROSCOPIC, WITH SALPINGO-OOPHORECTOMY N/A 02/07/2024    Procedure: HYSTERECTOMY,TOTAL,LAPAROSCOPIC,WITH SALPINGO-OOPHORECTOMY;  Surgeon: Leonard Hopkins MD;  Location: [facility] OR;  Service: OB/GYN;  Laterality: N/A;       Family History:   Family History   Problem Relation Name Age of Onset    Heart disease Mother      Hyperlipidemia Mother      Kidney disease Mother      Peripheral vascular disease Mother      Diabetes Mother      Stroke Father      Heart disease Father      Hyperlipidemia Father      Breast cancer Neg Hx      Colon cancer Neg Hx      Ovarian cancer Neg Hx      Uterine cancer Neg Hx         Social History:  reports that she has never smoked. She has never been exposed to tobacco smoke. She has never used smokeless tobacco. She reports that she does not  "currently use alcohol. She reports that she does not use drugs.    Allergies:  Review of patient's allergies indicates:  No Known Allergies    Medications:  Current Outpatient Medications   Medication Sig Dispense Refill    ascorbic acid, vitamin C, (VITAMIN C) 1000 MG tablet Take 1 tablet by mouth once daily.      cholecalciferol, vitamin D3, 125 mcg (5,000 unit) capsule Take 1 capsule by mouth once daily.      co-enzyme Q-10 30 mg capsule Take 30 mg by mouth 3 (three) times daily.      cyanocobalamin 500 MCG tablet Take 500 mcg by mouth 2 (two) times a day.      cyclobenzaprine (FLEXERIL) 10 MG tablet Take 10 mg by mouth 2 (two) times daily as needed.      CYMBALTA 30 mg capsule Take 30 mg by mouth 2 (two) times daily.      eszopiclone (LUNESTA) 3 mg Tab Take 3 mg by mouth nightly as needed.      ferrous sulfate 325 (65 FE) MG EC tablet Take 325 mg by mouth once daily.      folic acid (FOLVITE) 800 MCG Tab Take 800 mcg by mouth once daily.      gabapentin (NEURONTIN) 100 MG capsule Take 100 mg by mouth 3 (three) times daily.      meloxicam (MOBIC) 7.5 MG tablet Take 7.5 mg by mouth once daily.      VISTARIL 25 mg Cap Take 25-50 mg by mouth nightly as needed.      vitamin A 26977 UNIT capsule Take 10,000 Units by mouth once daily.      vitamin E 400 UNIT capsule Take 400 Units by mouth once daily.      zinc acetate 50 mg (zinc) Cap Take 1 capsule by mouth once daily.       No current facility-administered medications for this visit.       Review of Systems   All other systems reviewed and are negative.      ECOG Performance Status:   ECOG SCORE             Objective:      Vitals:   Vitals:    05/23/24 1443   BP: 125/80   Pulse: 94   Resp: 18   Temp: 97.8 °F (36.6 °C)   SpO2: 95%   Weight: 104 kg (229 lb 3.2 oz)   Height: 5' 7.99" (1.727 m)         BMI: Body mass index is 34.86 kg/m².    Physical Exam  Constitutional:       Appearance: Normal appearance. She is normal weight.   HENT:      Head: Normocephalic and " atraumatic.   Eyes:      Extraocular Movements: Extraocular movements intact.      Conjunctiva/sclera: Conjunctivae normal.   Cardiovascular:      Rate and Rhythm: Normal rate and regular rhythm.   Pulmonary:      Effort: Pulmonary effort is normal.      Breath sounds: Normal breath sounds.   Abdominal:      General: Abdomen is flat. Bowel sounds are normal.      Palpations: Abdomen is soft.   Musculoskeletal:         General: Normal range of motion.      Cervical back: Normal range of motion and neck supple.   Skin:     General: Skin is warm and dry.   Neurological:      General: No focal deficit present.      Mental Status: She is alert and oriented to person, place, and time.   Psychiatric:         Mood and Affect: Mood normal.         Behavior: Behavior normal.       Laboratory Data:  Lab Visit on 05/23/2024   Component Date Value Ref Range Status    Iron Binding Capacity Unsaturated 05/23/2024 231  70 - 310 ug/dL Final    Iron Level 05/23/2024 75  50 - 170 ug/dL Final    Iron Binding Capacity Total 05/23/2024 306  250 - 450 ug/dL Final    Iron Saturation 05/23/2024 25  20 - 50 % Final    Ferritin Level 05/23/2024 45.33  4.63 - 204.00 ng/mL Final    WBC 05/23/2024 3.87 (L)  4.50 - 11.50 x10(3)/mcL Final    RBC 05/23/2024 4.08 (L)  4.20 - 5.40 x10(6)/mcL Final    Hgb 05/23/2024 11.2 (L)  12.0 - 16.0 g/dL Final    Hct 05/23/2024 35.4 (L)  37.0 - 47.0 % Final    MCV 05/23/2024 86.8  80.0 - 94.0 fL Final    MCH 05/23/2024 27.5  27.0 - 31.0 pg Final    MCHC 05/23/2024 31.6 (L)  33.0 - 36.0 g/dL Final    RDW 05/23/2024 14.0  11.5 - 17.0 % Final    Platelet 05/23/2024 240  130 - 400 x10(3)/mcL Final    MPV 05/23/2024 10.7 (H)  7.4 - 10.4 fL Final    Neut % 05/23/2024 48.3  % Final    Lymph % 05/23/2024 37.2  % Final    Mono % 05/23/2024 9.6  % Final    Eos % 05/23/2024 4.1  % Final    Basophil % 05/23/2024 0.5  % Final    Lymph # 05/23/2024 1.44  0.6 - 4.6 x10(3)/mcL Final    Neut # 05/23/2024 1.87 (L)  2.1 - 9.2  x10(3)/mcL Final    Mono # 05/23/2024 0.37  0.1 - 1.3 x10(3)/mcL Final    Eos # 05/23/2024 0.16  0 - 0.9 x10(3)/mcL Final    Baso # 05/23/2024 0.02  <=0.2 x10(3)/mcL Final    IG# 05/23/2024 0.01  0 - 0.04 x10(3)/mcL Final    IG% 05/23/2024 0.3  % Final         Imaging:   Assessment:     No diagnosis found.      Plan:   Work up with hepatitis panel, HIV copper, ERLIN, ESR, CRP, Complete abdominal US, path review of peripheral smear and myeloma workup all normal. Already had a normal B12 and folate.   She was given  40 mg dose of prednisone and WBC demarginated and increased to 5.1   History of iron deficiency due to heavy periods secondary to fibroids. Has received IV iron in 2022 and 2021, .She has had her hysterectomy and ferritin is up to 45 from 29 so will continue oral iron until ferritin is around 100 and then will dc.   Problem List Items Addressed This Visit    None      No further work up required for neutropenia. Continue oral iron and will trend her iron levels until ferritin is about 100 now that she has had a hysterectomy. RTC in 4 months with CBC and iron studies. .

## 2024-05-22 NOTE — PROGRESS NOTES
Chief Complaint:   Well Woman     History of Present Illness:  Efraín Reyes is a 48 y.o. year old  presents for her well woman exam status post H BSO secondary to menometrorrhagia. No vaginal bleeding following hysterectomy. Denies any health changes.     C/o hot flashes. Tolerable, does not want medication.      Gyn History:  Menstrual History   Cycle: No  Menarche Age: 13 years  No Cycle Reason: (!) Surgical  Surgical Reason: hysterectomy  [place]  Sexually Active: Yes  Sexual Orientation: heterosexual  Postcoital Bleeding: No  Dyspareunia: No  STI History: Yes  STI Type: Trichomonas  Contraception: N/a  Menopause  Menopause Age: 48 years  Post Menopausal Bleeding: No  Hormone Replacement Therapy: No  Breast History  Last Breast Imaging Date: Yes  Date: 23  History of Abnormal Breast Imaging : (!) Yes (10 ago per pt)  History of Breast Biopsy: No  Results of Last Biopsy: No  Pap History   Last pap date: 23  Result: Normal  History of Abnormal Pap: No  HPV Vaccine Completed: No      Review of Systems:  General/Constitutional: Chills denies. Fatigue/weakness denies. Fever denies. Night sweats denies. Hot flashes denies    Respiratory: Cough denies. Hemoptysis denies. SOB denies. Sputum production denies. Wheezing denies .   Cardiovascular: Chest pain denies. Dizziness denies. Palpitations denies. Swelling in hands/feet denies.                Breast: Dimpling denies. Breast mass denies. Breast pain/tenderness denies. Nipple discharge denies. Puckering denies.  Gastrointestinal: Abdominal pain denies. Blood in stool denies. Constipation denies. Diarrhea denies. Heartburn denies. Nausea denies. Vomiting denies    Genitourinary: Incontinence denies. Blood in urine denies. Frequent urination denies. Painful urination denies. Urinary urgency denies. Nocturia denies    Gynecologic: Irregular menses denies. Heavy bleeding denies. Painful menses denies. Vaginal discharge denies. Vaginal odor  denies. Vaginal itching denies. Vaginal lesion denies. Pelvic pain denies. Decreased libido denies. Vulvar lesion denies. Prolapse of genital organs denies. Painful intercourse denies. Postcoital bleeding denies    Psychiatric: Depression denies. Anxiety denies.     OB History    Para Term  AB Living   1 1 1 0 0 1   SAB IAB Ectopic Multiple Live Births   0 0 0 0 1      # 1 - Date: [birth year], Sex: Female, Weight: 3.515 kg (7 lb 12 oz), GA: None, Type: Vaginal, Spontaneous, Apgar1: None, Apgar5: None, Living: Living, Birth Comments: None      Past Medical History:   Diagnosis Date    Anemia     Benign neoplasm of nerve sheath     Cervical pain     Lumbar radiculopathy     Muscle spasticity     Muscle wasting and atrophy, not elsewhere classified, other site     Neutropenia, unspecified type     Thoracic back pain        Current Outpatient Medications:     ascorbic acid, vitamin C, (VITAMIN C) 1000 MG tablet, Take 1 tablet by mouth once daily., Disp: , Rfl:     cholecalciferol, vitamin D3, 125 mcg (5,000 unit) capsule, Take 1 capsule by mouth once daily., Disp: , Rfl:     co-enzyme Q-10 30 mg capsule, Take 30 mg by mouth 3 (three) times daily., Disp: , Rfl:     cyanocobalamin 500 MCG tablet, Take 500 mcg by mouth 2 (two) times a day., Disp: , Rfl:     cyclobenzaprine (FLEXERIL) 10 MG tablet, Take 10 mg by mouth 2 (two) times daily as needed., Disp: , Rfl:     CYMBALTA 30 mg capsule, Take 30 mg by mouth 2 (two) times daily., Disp: , Rfl:     eszopiclone (LUNESTA) 3 mg Tab, Take 3 mg by mouth nightly as needed., Disp: , Rfl:     ferrous sulfate 325 (65 FE) MG EC tablet, Take 325 mg by mouth once daily., Disp: , Rfl:     folic acid (FOLVITE) 800 MCG Tab, Take 800 mcg by mouth once daily., Disp: , Rfl:     gabapentin (NEURONTIN) 100 MG capsule, Take 100 mg by mouth 3 (three) times daily., Disp: , Rfl:     meloxicam (MOBIC) 7.5 MG tablet, Take 7.5 mg by mouth once daily., Disp: , Rfl:     VISTARIL 25 mg Cap,  "Take 25-50 mg by mouth nightly as needed., Disp: , Rfl:     vitamin A 14859 UNIT capsule, Take 10,000 Units by mouth once daily., Disp: , Rfl:     vitamin E 400 UNIT capsule, Take 400 Units by mouth once daily., Disp: , Rfl:     zinc acetate 50 mg (zinc) Cap, Take 1 capsule by mouth once daily., Disp: , Rfl:     Review of patient's allergies indicates:  No Known Allergies    Past Surgical History:   Procedure Laterality Date    C6-T1 LAMINECTOMIES W/ EXCISION OF RIGHT C8 SCHWANNOMA  05/01/2020    Dr. Orlando    ENDOMETRIAL BIOPSY  2023    HYSTERECTOMY  02/07/2024    HYSTERECTOMY, TOTAL, LAPAROSCOPIC, WITH SALPINGO-OOPHORECTOMY N/A 02/07/2024    Procedure: HYSTERECTOMY,TOTAL,LAPAROSCOPIC,WITH SALPINGO-OOPHORECTOMY;  Surgeon: Leonard Hopkins MD;  Location: [facility];  Service: OB/GYN;  Laterality: N/A;     Family History   Problem Relation Name Age of Onset    Heart disease Mother      Hyperlipidemia Mother      Kidney disease Mother      Peripheral vascular disease Mother      Diabetes Mother      Stroke Father      Heart disease Father      Hyperlipidemia Father      Breast cancer Neg Hx      Colon cancer Neg Hx      Ovarian cancer Neg Hx      Uterine cancer Neg Hx       Social History     Socioeconomic History    Marital status:    Tobacco Use    Smoking status: Never     Passive exposure: Never    Smokeless tobacco: Never   Substance and Sexual Activity    Alcohol use: Not Currently     Comment: occasional    Drug use: Never    Sexual activity: Yes     Partners: Male     Birth control/protection: See Surgical Hx     Comment: STI: Trich       Physical Exam:  /80 (BP Location: Left arm, Patient Position: Sitting, BP Method: Medium (Manual))   Temp 97.3 °F (36.3 °C) (Temporal)   Ht 5' 8" (1.727 m)   Wt 103.8 kg (228 lb 12.8 oz)   LMP 01/19/2024   BMI 34.79 kg/m²     Chaperone: present.       General appearance: healthy, well-nourished and well-developed     Psychiatric: Orientation to time, place and " person. Normal mood and affect and active, alert     Skin: Appearance: no rashes or lesions.     Neck:   Neck: supple, FROM, trachea midline. and no masses   Thyroid: no enlargement or nodules and non-tender.       Cardiovascular:   Auscultation: RRR and no murmur.   Peripheral Vascular: no varicosities, LLE edema, RLE edema, calf tenderness, and palpable cord and pedal pulses intact.     Lungs:   Respiratory effort: no intercostal retractions or accessory muscle usage.   Auscultation: no wheezing, rales/crackles, or rhonchi and clear to auscultation.     Breast:   Inspection/Palpation: no tenderness, discrete/distinct masses, skin changes, or abnormal secretions. Nipple appearance normal.     Abdomen:   Auscultation/Inspection/Palpation: no hepatomegaly, splenomegaly, masses, tenderness or CVA tenderness and soft, non-distended bowel sounds preset.    Hernia: no palpable hernias.     Female Genitalia:    Vulva: no masses, tenderness or lesions    Bladder/Urethra: no urethral discharge or mass, normal meatus, bladder non-distended.    Vagina: no tenderness, erythema, cystocele, rectocele, abnormal vaginal discharge or vesicle(s) or ulcers    Cuff intact, no masses, NT   Adnexa/Parametria: no parametrial tenderness or mass, no adnexal tenderness or ovarian mass.     Lymph Nodes:   Palpation: non tender submandibular nodes, axillary nodes, or inguinal nodes.     Rectal Exam:   Rectum: normal perianal skin.       Assessment/Plan:  1. Well woman exam  No pap, s/p hyst, + h/o abnl pap, early 20s  Recommend BSE monthly  Discussed recommendations of annual screening after age of 40 with mammogram  Explained that screening is not 100% reliable. Advised patient if she notices any changes to her breast including a lump, mass, dimpling, discharge, rash, or tenderness she should contact us immediately.     Healthy diet, exercise   MMG  Multivitamin   Seat belt   Sunscreen use   Safe sex/ STI education   Annual labs: w/ PCP,   divina    2. Hot flash, menopausal  Educated  Layered clothing, fans  Advised pt to call if symptoms worsen         This note was transcribed by Nell Cormier MA. There may be transcription errors as a result, however minimal. Effort has been made to ensure accuracy of transcription, but any obvious errors or omissions should be clarified with the author of the document.

## 2024-05-23 ENCOUNTER — OFFICE VISIT (OUTPATIENT)
Dept: HEMATOLOGY/ONCOLOGY | Facility: CLINIC | Age: 49
End: 2024-05-23
Payer: COMMERCIAL

## 2024-05-23 ENCOUNTER — OFFICE VISIT (OUTPATIENT)
Dept: OBSTETRICS AND GYNECOLOGY | Facility: CLINIC | Age: 49
End: 2024-05-23
Payer: COMMERCIAL

## 2024-05-23 ENCOUNTER — LAB VISIT (OUTPATIENT)
Dept: LAB | Facility: HOSPITAL | Age: 49
End: 2024-05-23
Attending: INTERNAL MEDICINE
Payer: COMMERCIAL

## 2024-05-23 VITALS
TEMPERATURE: 98 F | HEIGHT: 68 IN | WEIGHT: 229.19 LBS | OXYGEN SATURATION: 95 % | DIASTOLIC BLOOD PRESSURE: 80 MMHG | SYSTOLIC BLOOD PRESSURE: 125 MMHG | RESPIRATION RATE: 18 BRPM | BODY MASS INDEX: 34.74 KG/M2 | HEART RATE: 94 BPM

## 2024-05-23 VITALS
SYSTOLIC BLOOD PRESSURE: 120 MMHG | DIASTOLIC BLOOD PRESSURE: 80 MMHG | HEIGHT: 68 IN | TEMPERATURE: 97 F | BODY MASS INDEX: 34.68 KG/M2 | WEIGHT: 228.81 LBS

## 2024-05-23 DIAGNOSIS — D50.0 IRON DEFICIENCY ANEMIA DUE TO CHRONIC BLOOD LOSS: Primary | ICD-10-CM

## 2024-05-23 DIAGNOSIS — D70.8 BENIGN ETHNIC NEUTROPENIA: ICD-10-CM

## 2024-05-23 DIAGNOSIS — D70.9 NEUTROPENIA, UNSPECIFIED TYPE: ICD-10-CM

## 2024-05-23 DIAGNOSIS — N95.1 HOT FLASH, MENOPAUSAL: ICD-10-CM

## 2024-05-23 DIAGNOSIS — Z12.31 BREAST CANCER SCREENING BY MAMMOGRAM: ICD-10-CM

## 2024-05-23 DIAGNOSIS — Z86.018 HISTORY OF UTERINE FIBROID: ICD-10-CM

## 2024-05-23 DIAGNOSIS — Z01.419 WELL WOMAN EXAM: Primary | ICD-10-CM

## 2024-05-23 LAB
BASOPHILS # BLD AUTO: 0.02 X10(3)/MCL
BASOPHILS NFR BLD AUTO: 0.5 %
EOSINOPHIL # BLD AUTO: 0.16 X10(3)/MCL (ref 0–0.9)
EOSINOPHIL NFR BLD AUTO: 4.1 %
ERYTHROCYTE [DISTWIDTH] IN BLOOD BY AUTOMATED COUNT: 14 % (ref 11.5–17)
FERRITIN SERPL-MCNC: 45.33 NG/ML (ref 4.63–204)
HCT VFR BLD AUTO: 35.4 % (ref 37–47)
HGB BLD-MCNC: 11.2 G/DL (ref 12–16)
IMM GRANULOCYTES # BLD AUTO: 0.01 X10(3)/MCL (ref 0–0.04)
IMM GRANULOCYTES NFR BLD AUTO: 0.3 %
IRON SATN MFR SERPL: 25 % (ref 20–50)
IRON SERPL-MCNC: 75 UG/DL (ref 50–170)
LYMPHOCYTES # BLD AUTO: 1.44 X10(3)/MCL (ref 0.6–4.6)
LYMPHOCYTES NFR BLD AUTO: 37.2 %
MCH RBC QN AUTO: 27.5 PG (ref 27–31)
MCHC RBC AUTO-ENTMCNC: 31.6 G/DL (ref 33–36)
MCV RBC AUTO: 86.8 FL (ref 80–94)
MONOCYTES # BLD AUTO: 0.37 X10(3)/MCL (ref 0.1–1.3)
MONOCYTES NFR BLD AUTO: 9.6 %
NEUTROPHILS # BLD AUTO: 1.87 X10(3)/MCL (ref 2.1–9.2)
NEUTROPHILS NFR BLD AUTO: 48.3 %
PLATELET # BLD AUTO: 240 X10(3)/MCL (ref 130–400)
PMV BLD AUTO: 10.7 FL (ref 7.4–10.4)
RBC # BLD AUTO: 4.08 X10(6)/MCL (ref 4.2–5.4)
TIBC SERPL-MCNC: 231 UG/DL (ref 70–310)
TIBC SERPL-MCNC: 306 UG/DL (ref 250–450)
WBC # SPEC AUTO: 3.87 X10(3)/MCL (ref 4.5–11.5)

## 2024-05-23 PROCEDURE — 3044F HG A1C LEVEL LT 7.0%: CPT | Mod: CPTII,S$GLB,, | Performed by: INTERNAL MEDICINE

## 2024-05-23 PROCEDURE — 36415 COLL VENOUS BLD VENIPUNCTURE: CPT

## 2024-05-23 PROCEDURE — 99213 OFFICE O/P EST LOW 20 MIN: CPT | Mod: S$GLB,,, | Performed by: INTERNAL MEDICINE

## 2024-05-23 PROCEDURE — 1159F MED LIST DOCD IN RCRD: CPT | Mod: CPTII,,, | Performed by: OBSTETRICS & GYNECOLOGY

## 2024-05-23 PROCEDURE — 3079F DIAST BP 80-89 MM HG: CPT | Mod: CPTII,,, | Performed by: OBSTETRICS & GYNECOLOGY

## 2024-05-23 PROCEDURE — 99396 PREV VISIT EST AGE 40-64: CPT | Mod: ,,, | Performed by: OBSTETRICS & GYNECOLOGY

## 2024-05-23 PROCEDURE — 99999 PR PBB SHADOW E&M-EST. PATIENT-LVL IV: CPT | Mod: PBBFAC,,, | Performed by: INTERNAL MEDICINE

## 2024-05-23 PROCEDURE — 3044F HG A1C LEVEL LT 7.0%: CPT | Mod: CPTII,,, | Performed by: OBSTETRICS & GYNECOLOGY

## 2024-05-23 PROCEDURE — 1159F MED LIST DOCD IN RCRD: CPT | Mod: CPTII,S$GLB,, | Performed by: INTERNAL MEDICINE

## 2024-05-23 PROCEDURE — 83540 ASSAY OF IRON: CPT

## 2024-05-23 PROCEDURE — 3008F BODY MASS INDEX DOCD: CPT | Mod: CPTII,,, | Performed by: OBSTETRICS & GYNECOLOGY

## 2024-05-23 PROCEDURE — 3074F SYST BP LT 130 MM HG: CPT | Mod: CPTII,,, | Performed by: OBSTETRICS & GYNECOLOGY

## 2024-05-23 PROCEDURE — 3074F SYST BP LT 130 MM HG: CPT | Mod: CPTII,S$GLB,, | Performed by: INTERNAL MEDICINE

## 2024-05-23 PROCEDURE — 82728 ASSAY OF FERRITIN: CPT

## 2024-05-23 PROCEDURE — 3008F BODY MASS INDEX DOCD: CPT | Mod: CPTII,S$GLB,, | Performed by: INTERNAL MEDICINE

## 2024-05-23 PROCEDURE — 85025 COMPLETE CBC W/AUTO DIFF WBC: CPT

## 2024-05-23 PROCEDURE — 3079F DIAST BP 80-89 MM HG: CPT | Mod: CPTII,S$GLB,, | Performed by: INTERNAL MEDICINE

## 2024-05-23 RX ORDER — HYDROXYZINE PAMOATE 25 MG/1
25-50 CAPSULE ORAL NIGHTLY PRN
COMMUNITY
Start: 2024-05-03

## 2024-05-23 RX ORDER — DULOXETINE HYDROCHLORIDE 30 MG/1
30 CAPSULE, DELAYED RELEASE ORAL 2 TIMES DAILY
COMMUNITY
Start: 2024-05-03

## 2024-05-28 ENCOUNTER — HOSPITAL ENCOUNTER (OUTPATIENT)
Dept: RADIOLOGY | Facility: HOSPITAL | Age: 49
Discharge: HOME | End: 2024-05-28
Attending: OBSTETRICS & GYNECOLOGY
Payer: COMMERCIAL

## 2024-05-28 DIAGNOSIS — Z12.31 BREAST CANCER SCREENING BY MAMMOGRAM: ICD-10-CM

## 2024-05-28 PROCEDURE — 77063 BREAST TOMOSYNTHESIS BI: CPT | Mod: TC

## 2024-06-05 ENCOUNTER — LAB VISIT (OUTPATIENT)
Dept: LAB | Facility: HOSPITAL | Age: 49
End: 2024-06-05
Attending: NURSE PRACTITIONER
Payer: COMMERCIAL

## 2024-06-05 DIAGNOSIS — D50.9 IRON DEFICIENCY ANEMIA, UNSPECIFIED: ICD-10-CM

## 2024-06-05 DIAGNOSIS — F34.1 DYSTHYMIC DISORDER: Primary | ICD-10-CM

## 2024-06-05 DIAGNOSIS — E55.9 AVITAMINOSIS D: ICD-10-CM

## 2024-06-05 DIAGNOSIS — N92.1 METRORRHAGIA: ICD-10-CM

## 2024-06-05 DIAGNOSIS — D51.3 VEGANS' ANEMIA: ICD-10-CM

## 2024-06-05 DIAGNOSIS — D72.818 BASOPHILOPENIA: ICD-10-CM

## 2024-06-05 DIAGNOSIS — F51.02 TRANSIENT DISORDER OF INITIATING OR MAINTAINING SLEEP: ICD-10-CM

## 2024-06-05 LAB
25(OH)D3+25(OH)D2 SERPL-MCNC: 26 NG/ML (ref 30–80)
ALBUMIN SERPL-MCNC: 3.8 G/DL (ref 3.5–5)
ALBUMIN/GLOB SERPL: 1 RATIO (ref 1.1–2)
ALP SERPL-CCNC: 88 UNIT/L (ref 40–150)
ALT SERPL-CCNC: 13 UNIT/L (ref 0–55)
ANION GAP SERPL CALC-SCNC: 5 MEQ/L
AST SERPL-CCNC: 19 UNIT/L (ref 5–34)
BASOPHILS # BLD AUTO: 0.02 X10(3)/MCL
BASOPHILS NFR BLD AUTO: 0.6 %
BILIRUB SERPL-MCNC: 1.1 MG/DL
BILIRUB UR QL STRIP.AUTO: NEGATIVE
BUN SERPL-MCNC: 20 MG/DL (ref 7–18.7)
CALCIUM SERPL-MCNC: 9.6 MG/DL (ref 8.4–10.2)
CHLORIDE SERPL-SCNC: 109 MMOL/L (ref 98–107)
CHOLEST SERPL-MCNC: 155 MG/DL
CHOLEST/HDLC SERPL: 2 {RATIO} (ref 0–5)
CLARITY UR: CLEAR
CO2 SERPL-SCNC: 26 MMOL/L (ref 22–29)
COLOR UR AUTO: YELLOW
CREAT SERPL-MCNC: 0.84 MG/DL (ref 0.55–1.02)
CREAT/UREA NIT SERPL: 24
EOSINOPHIL # BLD AUTO: 0.18 X10(3)/MCL (ref 0–0.9)
EOSINOPHIL NFR BLD AUTO: 5 %
ERYTHROCYTE [DISTWIDTH] IN BLOOD BY AUTOMATED COUNT: 14 % (ref 11.5–17)
GFR SERPLBLD CREATININE-BSD FMLA CKD-EPI: >60 ML/MIN/1.73/M2
GLOBULIN SER-MCNC: 3.7 GM/DL (ref 2.4–3.5)
GLUCOSE SERPL-MCNC: 95 MG/DL (ref 74–100)
GLUCOSE UR QL STRIP: NEGATIVE
HCT VFR BLD AUTO: 33.5 % (ref 37–47)
HDLC SERPL-MCNC: 83 MG/DL (ref 35–60)
HGB BLD-MCNC: 10.6 G/DL (ref 12–16)
HGB UR QL STRIP: NEGATIVE
IMM GRANULOCYTES # BLD AUTO: 0 X10(3)/MCL (ref 0–0.04)
IMM GRANULOCYTES NFR BLD AUTO: 0 %
KETONES UR QL STRIP: NEGATIVE
LDLC SERPL CALC-MCNC: 62 MG/DL (ref 50–140)
LEUKOCYTE ESTERASE UR QL STRIP: NEGATIVE
LYMPHOCYTES # BLD AUTO: 1.25 X10(3)/MCL (ref 0.6–4.6)
LYMPHOCYTES NFR BLD AUTO: 35 %
MCH RBC QN AUTO: 27.3 PG (ref 27–31)
MCHC RBC AUTO-ENTMCNC: 31.6 G/DL (ref 33–36)
MCV RBC AUTO: 86.3 FL (ref 80–94)
MONOCYTES # BLD AUTO: 0.32 X10(3)/MCL (ref 0.1–1.3)
MONOCYTES NFR BLD AUTO: 9 %
NEUTROPHILS # BLD AUTO: 1.8 X10(3)/MCL (ref 2.1–9.2)
NEUTROPHILS NFR BLD AUTO: 50.4 %
NITRITE UR QL STRIP: NEGATIVE
PH UR STRIP: 6 [PH]
PLATELET # BLD AUTO: 244 X10(3)/MCL (ref 130–400)
PMV BLD AUTO: 10.6 FL (ref 7.4–10.4)
POTASSIUM SERPL-SCNC: 4.1 MMOL/L (ref 3.5–5.1)
PROT SERPL-MCNC: 7.5 GM/DL (ref 6.4–8.3)
PROT UR QL STRIP: NEGATIVE
RBC # BLD AUTO: 3.88 X10(6)/MCL (ref 4.2–5.4)
SODIUM SERPL-SCNC: 140 MMOL/L (ref 136–145)
SP GR UR STRIP.AUTO: >=1.03 (ref 1–1.03)
T3FREE SERPL-MCNC: 3.56 PG/ML (ref 1.58–3.91)
TRIGL SERPL-MCNC: 52 MG/DL (ref 37–140)
TSH SERPL-ACNC: 1.31 UIU/ML (ref 0.35–4.94)
UROBILINOGEN UR STRIP-ACNC: 1
VIT B12 SERPL-MCNC: 462 PG/ML (ref 213–816)
VLDLC SERPL CALC-MCNC: 10 MG/DL
WBC # SPEC AUTO: 3.57 X10(3)/MCL (ref 4.5–11.5)

## 2024-06-05 PROCEDURE — 80061 LIPID PANEL: CPT

## 2024-06-05 PROCEDURE — 84443 ASSAY THYROID STIM HORMONE: CPT

## 2024-06-05 PROCEDURE — 81003 URINALYSIS AUTO W/O SCOPE: CPT

## 2024-06-05 PROCEDURE — 82607 VITAMIN B-12: CPT

## 2024-06-05 PROCEDURE — 36415 COLL VENOUS BLD VENIPUNCTURE: CPT

## 2024-06-05 PROCEDURE — 84481 FREE ASSAY (FT-3): CPT

## 2024-06-05 PROCEDURE — 85025 COMPLETE CBC W/AUTO DIFF WBC: CPT

## 2024-06-05 PROCEDURE — 80053 COMPREHEN METABOLIC PANEL: CPT

## 2024-06-05 PROCEDURE — 82306 VITAMIN D 25 HYDROXY: CPT

## 2024-06-19 ENCOUNTER — LAB VISIT (OUTPATIENT)
Dept: LAB | Facility: HOSPITAL | Age: 49
End: 2024-06-19
Attending: INTERNAL MEDICINE
Payer: COMMERCIAL

## 2024-06-19 DIAGNOSIS — F51.02 TRANSIENT DISORDER OF INITIATING OR MAINTAINING SLEEP: ICD-10-CM

## 2024-06-19 DIAGNOSIS — D51.3 VEGANS' ANEMIA: ICD-10-CM

## 2024-06-19 DIAGNOSIS — E55.9 VITAMIN D DEFICIENCY: ICD-10-CM

## 2024-06-19 DIAGNOSIS — M53.82 MUSCULOSKELETAL DISORDER OF THE SUBOCCIPITAL: Primary | ICD-10-CM

## 2024-06-19 DIAGNOSIS — D64.9 ANEMIA, UNSPECIFIED TYPE: ICD-10-CM

## 2024-06-19 DIAGNOSIS — D50.9 IRON DEFICIENCY ANEMIA, UNSPECIFIED: ICD-10-CM

## 2024-06-19 LAB
ERYTHROCYTE [DISTWIDTH] IN BLOOD BY AUTOMATED COUNT: 14.1 % (ref 11.5–17)
FERRITIN SERPL-MCNC: 52.6 NG/ML (ref 4.63–204)
FOLATE SERPL-MCNC: 10.4 NG/ML (ref 7–31.4)
HCT VFR BLD AUTO: 33.5 % (ref 37–47)
HGB BLD-MCNC: 10.7 G/DL (ref 12–16)
IRON SATN MFR SERPL: 27 % (ref 20–50)
IRON SERPL-MCNC: 77 UG/DL (ref 50–170)
MCH RBC QN AUTO: 27.4 PG (ref 27–31)
MCHC RBC AUTO-ENTMCNC: 31.9 G/DL (ref 33–36)
MCV RBC AUTO: 85.7 FL (ref 80–94)
PLATELET # BLD AUTO: 227 X10(3)/MCL (ref 130–400)
PMV BLD AUTO: 10.6 FL (ref 7.4–10.4)
RBC # BLD AUTO: 3.91 X10(6)/MCL (ref 4.2–5.4)
RET# (OHS): 0.06 X10E6/UL (ref 0.02–0.08)
RETICULOCYTE COUNT AUTOMATED (OLG): 1.49 % (ref 1.1–2.1)
TIBC SERPL-MCNC: 213 UG/DL (ref 70–310)
TIBC SERPL-MCNC: 290 UG/DL (ref 250–450)
TRANSFERRIN SERPL-MCNC: 255 MG/DL (ref 180–382)
VIT B12 SERPL-MCNC: 529 PG/ML (ref 213–816)
WBC # BLD AUTO: 3.13 X10(3)/MCL (ref 4.5–11.5)

## 2024-06-19 PROCEDURE — 82746 ASSAY OF FOLIC ACID SERUM: CPT

## 2024-06-19 PROCEDURE — 85027 COMPLETE CBC AUTOMATED: CPT

## 2024-06-19 PROCEDURE — 82607 VITAMIN B-12: CPT

## 2024-06-19 PROCEDURE — 83540 ASSAY OF IRON: CPT

## 2024-06-19 PROCEDURE — 84466 ASSAY OF TRANSFERRIN: CPT

## 2024-06-19 PROCEDURE — 36415 COLL VENOUS BLD VENIPUNCTURE: CPT

## 2024-06-19 PROCEDURE — 85045 AUTOMATED RETICULOCYTE COUNT: CPT

## 2024-06-19 PROCEDURE — 82728 ASSAY OF FERRITIN: CPT

## 2024-10-11 ENCOUNTER — LAB VISIT (OUTPATIENT)
Dept: LAB | Facility: HOSPITAL | Age: 49
End: 2024-10-11
Attending: INTERNAL MEDICINE
Payer: COMMERCIAL

## 2024-10-11 ENCOUNTER — OFFICE VISIT (OUTPATIENT)
Dept: HEMATOLOGY/ONCOLOGY | Facility: CLINIC | Age: 49
End: 2024-10-11
Payer: COMMERCIAL

## 2024-10-11 VITALS
HEIGHT: 68 IN | BODY MASS INDEX: 35.56 KG/M2 | SYSTOLIC BLOOD PRESSURE: 137 MMHG | DIASTOLIC BLOOD PRESSURE: 84 MMHG | RESPIRATION RATE: 16 BRPM | HEART RATE: 69 BPM | WEIGHT: 234.63 LBS | TEMPERATURE: 98 F | OXYGEN SATURATION: 100 %

## 2024-10-11 DIAGNOSIS — Z86.018 HISTORY OF UTERINE FIBROID: ICD-10-CM

## 2024-10-11 DIAGNOSIS — Z67.A1 BENIGN ETHNIC NEUTROPENIA: Primary | ICD-10-CM

## 2024-10-11 DIAGNOSIS — D50.0 IRON DEFICIENCY ANEMIA DUE TO CHRONIC BLOOD LOSS: ICD-10-CM

## 2024-10-11 LAB
BASOPHILS # BLD AUTO: 0.02 X10(3)/MCL
BASOPHILS NFR BLD AUTO: 0.6 %
EOSINOPHIL # BLD AUTO: 0.11 X10(3)/MCL (ref 0–0.9)
EOSINOPHIL NFR BLD AUTO: 3 %
ERYTHROCYTE [DISTWIDTH] IN BLOOD BY AUTOMATED COUNT: 15 % (ref 11.5–17)
FERRITIN SERPL-MCNC: 77.67 NG/ML (ref 4.63–204)
HCT VFR BLD AUTO: 35.1 % (ref 37–47)
HGB BLD-MCNC: 11.1 G/DL (ref 12–16)
IMM GRANULOCYTES # BLD AUTO: 0.01 X10(3)/MCL (ref 0–0.04)
IMM GRANULOCYTES NFR BLD AUTO: 0.3 %
IRON SATN MFR SERPL: 29 % (ref 20–50)
IRON SERPL-MCNC: 71 UG/DL (ref 50–170)
LYMPHOCYTES # BLD AUTO: 1.51 X10(3)/MCL (ref 0.6–4.6)
LYMPHOCYTES NFR BLD AUTO: 41.8 %
MCH RBC QN AUTO: 27.4 PG (ref 27–31)
MCHC RBC AUTO-ENTMCNC: 31.6 G/DL (ref 33–36)
MCV RBC AUTO: 86.7 FL (ref 80–94)
MONOCYTES # BLD AUTO: 0.35 X10(3)/MCL (ref 0.1–1.3)
MONOCYTES NFR BLD AUTO: 9.7 %
NEUTROPHILS # BLD AUTO: 1.61 X10(3)/MCL (ref 2.1–9.2)
NEUTROPHILS NFR BLD AUTO: 44.6 %
PLATELET # BLD AUTO: 235 X10(3)/MCL (ref 130–400)
PMV BLD AUTO: 10.6 FL (ref 7.4–10.4)
RBC # BLD AUTO: 4.05 X10(6)/MCL (ref 4.2–5.4)
TIBC SERPL-MCNC: 174 UG/DL (ref 70–310)
TIBC SERPL-MCNC: 245 UG/DL (ref 250–450)
WBC # BLD AUTO: 3.61 X10(3)/MCL (ref 4.5–11.5)

## 2024-10-11 PROCEDURE — 36415 COLL VENOUS BLD VENIPUNCTURE: CPT

## 2024-10-11 PROCEDURE — 99999 PR PBB SHADOW E&M-EST. PATIENT-LVL V: CPT | Mod: PBBFAC,,,

## 2024-10-11 PROCEDURE — 83540 ASSAY OF IRON: CPT

## 2024-10-11 PROCEDURE — 83550 IRON BINDING TEST: CPT

## 2024-10-11 PROCEDURE — 85025 COMPLETE CBC W/AUTO DIFF WBC: CPT

## 2024-10-11 PROCEDURE — 82728 ASSAY OF FERRITIN: CPT

## 2024-10-11 RX ORDER — ESTRADIOL 0.03 MG/D
PATCH TRANSDERMAL
COMMUNITY
Start: 2024-09-30

## 2024-10-11 NOTE — PROGRESS NOTES
PATIENT: Efraín Reyes  MRN: [id number]  DATE: 10/11/2024   1975      Chief Complaint: Iron deficiency anemia due to chronic blood loss (No complaints. 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 She sees Dr. Schmidt and was referred to another GYN for hysterectomy who did not take her insurance. Patient has a long standing history of neutropenia dating back to her 20s. Documented Neutropenia in available labs dating back at least 7 years. Patient is actively taking oral iron 85mg of elemental iron, folate B12 and other vitamins. She has received IV iron infusions last year and the year before at [facility] area.  She does not have frequent infections.     LABS:    24 WBC 3.87, HGB 11.2, HCT 35.4, MCV 86, PLT 202K, ferritin 45, iron 75, iron sat 25%  24 WBC 3.21, hgb 10.6,ferritin 40, iron 71, sat 25  23 WBC 3.33, HGB 11.0, Iron 102, ferritin 33, sed Rate 19, CRP <1, quantitative immunoglobulins normal, free light chains normal, SPEP no M-spike, Hep and HIV nonreactive.   10/25/23 :  WBC 2.95, ANC 1.5, HGB 10.2, HCT 32.5, MCV 88, PLT 231K  10/2/23 WBC 2.88, HGB 11.14, HCT 34.8, MCV 90, MOG613, iron 84, iron sat 28, folate > 20, ferritin 41, B12 771, CMP WNL except total bili 1.4retic 1.55  22 WBC 3.4, ANC   21 WBC 4.8, ANC 2.07 WBC 3.8, AC 2.0    IMAGIN23 pelvic US:  Uterus: The uterus measures 8.0 x 5.3 x 8.8 cm with multiple (at least 3) benign-appearing intramural fibroids measuring as large as 4 cm in greatest diameter.  The endometrial stripe is thickened measuring 1.5 cm in AP thickness on transvaginal imaging.   Ovaries: The right ovary measures 3.5 x 2.3 x  2.4 cm and the left ovary measures 2.2 x 1.1 x 1.9 cm with benign-appearing follicles originating from both ovaries and 2 small (1.6 cm or less), moderately tender, benign-appearing, simple cysts noted originating from the right ovary.  11/2023 abd US:  unremarkable.   Subjective:   Interval History: Ms. Reyes is a 49 y.o. female who returns for new evaluation and treatment of. neutropenia   INTERVAL HISTORY:    10/11/2024: Patient presents to clinic for a four month follow up for iron deficiency anemia and benign ethnic neutropenia. Continues on oral iron daily, denies any GI side effects she reports blood in stool that started last month, last week cologuard was negative with Dr. Nelson. Denies any recent blood in stool. She had a KAI BSO on 2/7/2024, denies any vaginal bleeding. Denies chest pain, shortness of breath, dizziness, heart palpitations, restless leg syndrome,PICA symptoms   5/23/24 feels like she could run a mile. Her pain from the surgery is abating. Still taking iron every day but forgets twice a week.   2/28/24:  She had KAI BSO on 2/7/24 and is feeling a lot better.     Past Medical History:   Past Medical History:   Diagnosis Date    Anemia     Benign neoplasm of nerve sheath     Cervical pain     Lumbar radiculopathy     Muscle spasticity     Muscle wasting and atrophy, not elsewhere classified, other site     Neutropenia, unspecified type     Thoracic back pain    Uterine fibroids    Past Surgical HIstory:   Past Surgical History:   Procedure Laterality Date    C6-T1 LAMINECTOMIES W/ EXCISION OF RIGHT C8 SCHWANNOMA  05/01/2020    Dr. Orlando    ENDOMETRIAL BIOPSY  2023    HYSTERECTOMY  02/07/2024    HYSTERECTOMY, TOTAL, LAPAROSCOPIC, WITH SALPINGO-OOPHORECTOMY N/A 02/07/2024    Procedure: HYSTERECTOMY,TOTAL,LAPAROSCOPIC,WITH SALPINGO-OOPHORECTOMY;  Surgeon: Leonard Hopkins MD;  Location: [facility] OR;  Service: OB/GYN;  Laterality: N/A;       Family History:   Family History   Problem Relation Name  Age of Onset    Heart disease Mother      Hyperlipidemia Mother      Kidney disease Mother      Peripheral vascular disease Mother      Diabetes Mother      Stroke Father      Heart disease Father      Hyperlipidemia Father      Breast cancer Neg Hx      Colon cancer Neg Hx      Ovarian cancer Neg Hx      Uterine cancer Neg Hx         Social History:  reports that she has never smoked. She has never been exposed to tobacco smoke. She has never used smokeless tobacco. She reports that she does not currently use alcohol. She reports that she does not use drugs.    Allergies:  Review of patient's allergies indicates:  No Known Allergies    Medications:  Current Outpatient Medications   Medication Sig Dispense Refill    ascorbic acid, vitamin C, (VITAMIN C) 1000 MG tablet Take 1 tablet by mouth once daily.      cholecalciferol, vitamin D3, 125 mcg (5,000 unit) capsule Take 1 capsule by mouth once daily.      co-enzyme Q-10 30 mg capsule Take 30 mg by mouth 3 (three) times daily.      cyanocobalamin 500 MCG tablet Take 500 mcg by mouth 2 (two) times a day.      cyclobenzaprine (FLEXERIL) 10 MG tablet Take 10 mg by mouth 2 (two) times daily as needed.      CYMBALTA 30 mg capsule Take 30 mg by mouth 2 (two) times daily.      estradiol 0.025 mg/24 hr 0.025 mg/24 hr APPLY 1 PATCH TOPICALLY TO THE SKIN 1 TIME A WEEK      eszopiclone (LUNESTA) 3 mg Tab Take 3 mg by mouth nightly as needed.      ferrous sulfate 325 (65 FE) MG EC tablet Take 325 mg by mouth once daily.      folic acid (FOLVITE) 800 MCG Tab Take 800 mcg by mouth once daily.      gabapentin (NEURONTIN) 100 MG capsule Take 100 mg by mouth 3 (three) times daily.      meloxicam (MOBIC) 7.5 MG tablet Take 7.5 mg by mouth once daily.      VISTARIL 25 mg Cap Take 25-50 mg by mouth nightly as needed.      vitamin A 34245 UNIT capsule Take 10,000 Units by mouth once daily.      vitamin E 400 UNIT capsule Take 400 Units by mouth once daily.      zinc acetate 50 mg (zinc)  "Cap Take 1 capsule by mouth once daily.       No current facility-administered medications for this visit.       Review of Systems   Constitutional:  Negative for activity change, appetite change, fatigue, fever and unexpected weight change.   Eyes:  Negative for photophobia and visual disturbance.   Respiratory:  Negative for chest tightness and shortness of breath.    Cardiovascular:  Negative for chest pain and palpitations.   Gastrointestinal:  Negative for anal bleeding, blood in stool, constipation and diarrhea.   Genitourinary:  Negative for hematuria, pelvic pain and vaginal bleeding.   Allergic/Immunologic: Negative for immunocompromised state.   Neurological:  Negative for dizziness, weakness, light-headedness and headaches.   Hematological:  Negative for adenopathy. Does not bruise/bleed easily.   All other systems reviewed and are negative.      ECOG Performance Status:   ECOG SCORE             Objective:      Vitals:   Vitals:    10/11/24 1006   BP: 137/84   Pulse: 69   Resp: 16   Temp: 97.6 °F (36.4 °C)   TempSrc: Oral   SpO2: 100%   Weight: 106.4 kg (234 lb 9.6 oz)   Height: 5' 7.99" (1.727 m)           BMI: Body mass index is 35.68 kg/m².    Physical Exam  Constitutional:       Appearance: Normal appearance. She is normal weight.   HENT:      Head: Normocephalic and atraumatic.   Eyes:      Extraocular Movements: Extraocular movements intact.      Conjunctiva/sclera: Conjunctivae normal.   Cardiovascular:      Rate and Rhythm: Normal rate and regular rhythm.   Pulmonary:      Effort: Pulmonary effort is normal.      Breath sounds: Normal breath sounds.   Abdominal:      General: Abdomen is flat. Bowel sounds are normal.      Palpations: Abdomen is soft.   Musculoskeletal:         General: Normal range of motion.      Cervical back: Normal range of motion and neck supple.   Skin:     General: Skin is warm and dry.   Neurological:      General: No focal deficit present.      Mental Status: She is alert " and oriented to person, place, and time.   Psychiatric:         Mood and Affect: Mood normal.         Behavior: Behavior normal.       Laboratory Data:  Lab Visit on 10/11/2024   Component Date Value Ref Range Status    Ferritin Level 10/11/2024 77.67  4.63 - 204.00 ng/mL Final    Iron Binding Capacity Unsaturated 10/11/2024 174  70 - 310 ug/dL Final    Iron Level 10/11/2024 71  50 - 170 ug/dL Final    Iron Binding Capacity Total 10/11/2024 245 (L)  250 - 450 ug/dL Final    Iron Saturation 10/11/2024 29  20 - 50 % Final    WBC 10/11/2024 3.61 (L)  4.50 - 11.50 x10(3)/mcL Final    RBC 10/11/2024 4.05 (L)  4.20 - 5.40 x10(6)/mcL Final    Hgb 10/11/2024 11.1 (L)  12.0 - 16.0 g/dL Final    Hct 10/11/2024 35.1 (L)  37.0 - 47.0 % Final    MCV 10/11/2024 86.7  80.0 - 94.0 fL Final    MCH 10/11/2024 27.4  27.0 - 31.0 pg Final    MCHC 10/11/2024 31.6 (L)  33.0 - 36.0 g/dL Final    RDW 10/11/2024 15.0  11.5 - 17.0 % Final    Platelet 10/11/2024 235  130 - 400 x10(3)/mcL Final    MPV 10/11/2024 10.6 (H)  7.4 - 10.4 fL Final    Neut % 10/11/2024 44.6  % Final    Lymph % 10/11/2024 41.8  % Final    Mono % 10/11/2024 9.7  % Final    Eos % 10/11/2024 3.0  % Final    Basophil % 10/11/2024 0.6  % Final    Lymph # 10/11/2024 1.51  0.6 - 4.6 x10(3)/mcL Final    Neut # 10/11/2024 1.61 (L)  2.1 - 9.2 x10(3)/mcL Final    Mono # 10/11/2024 0.35  0.1 - 1.3 x10(3)/mcL Final    Eos # 10/11/2024 0.11  0 - 0.9 x10(3)/mcL Final    Baso # 10/11/2024 0.02  <=0.2 x10(3)/mcL Final    IG# 10/11/2024 0.01  0 - 0.04 x10(3)/mcL Final    IG% 10/11/2024 0.3  % Final         Imaging:   Assessment:     1. Benign ethnic neutropenia    2. Iron deficiency anemia due to chronic blood loss    3. History of uterine fibroid          Plan:   Work up with hepatitis panel, HIV copper, ERLIN, ESR, CRP, Complete abdominal US, path review of peripheral smear and myeloma workup all normal. Already had a normal B12 and folate.   She was given  40 mg dose of prednisone and  WBC demarginated and increased to 5.1   History of iron deficiency due to heavy periods secondary to fibroids. Has received IV iron in 2022 and 2021, .She has had her hysterectomy and ferritin is up to 45 from 29 so will continue oral iron until ferritin is around 100 and then will dc.   Problem List Items Addressed This Visit          Oncology    Iron deficiency anemia, unspecified     Other Visit Diagnoses       Benign ethnic neutropenia    -  Primary    History of uterine fibroid                No further work up required for neutropenia.   Continue oral iron and will trend her iron levels until ferritin is about 100 now that she has had a hysterectomy. Ferritin level today 77.67, increased from 52.60 on 6/16/2024  RTC in 4 months with CBC and iron studies. .

## 2025-02-04 DIAGNOSIS — D50.0 IRON DEFICIENCY ANEMIA DUE TO CHRONIC BLOOD LOSS: Primary | ICD-10-CM

## 2025-02-04 DIAGNOSIS — D70.9 NEUTROPENIA, UNSPECIFIED TYPE: ICD-10-CM

## 2025-02-12 ENCOUNTER — LAB VISIT (OUTPATIENT)
Dept: LAB | Facility: HOSPITAL | Age: 50
End: 2025-02-12
Payer: COMMERCIAL

## 2025-02-12 ENCOUNTER — OFFICE VISIT (OUTPATIENT)
Dept: HEMATOLOGY/ONCOLOGY | Facility: CLINIC | Age: 50
End: 2025-02-12
Payer: COMMERCIAL

## 2025-02-12 ENCOUNTER — PATIENT MESSAGE (OUTPATIENT)
Dept: HEMATOLOGY/ONCOLOGY | Facility: CLINIC | Age: 50
End: 2025-02-12

## 2025-02-12 VITALS
HEART RATE: 69 BPM | BODY MASS INDEX: 32.95 KG/M2 | TEMPERATURE: 98 F | WEIGHT: 243.31 LBS | RESPIRATION RATE: 16 BRPM | HEIGHT: 72 IN | DIASTOLIC BLOOD PRESSURE: 89 MMHG | OXYGEN SATURATION: 98 % | SYSTOLIC BLOOD PRESSURE: 134 MMHG

## 2025-02-12 DIAGNOSIS — Z67.A1 BENIGN ETHNIC NEUTROPENIA: Primary | ICD-10-CM

## 2025-02-12 DIAGNOSIS — D70.9 NEUTROPENIA, UNSPECIFIED TYPE: ICD-10-CM

## 2025-02-12 DIAGNOSIS — D50.0 IRON DEFICIENCY ANEMIA DUE TO CHRONIC BLOOD LOSS: ICD-10-CM

## 2025-02-12 LAB
ALBUMIN SERPL-MCNC: 3.9 G/DL (ref 3.5–5)
ALBUMIN/GLOB SERPL: 1 RATIO (ref 1.1–2)
ALP SERPL-CCNC: 98 UNIT/L (ref 40–150)
ALT SERPL-CCNC: 9 UNIT/L (ref 0–55)
ANION GAP SERPL CALC-SCNC: 5 MEQ/L
AST SERPL-CCNC: 15 UNIT/L (ref 5–34)
BASOPHILS # BLD AUTO: 0.02 X10(3)/MCL
BASOPHILS NFR BLD AUTO: 0.5 %
BILIRUB SERPL-MCNC: 0.7 MG/DL
BUN SERPL-MCNC: 17 MG/DL (ref 7–18.7)
CALCIUM SERPL-MCNC: 9 MG/DL (ref 8.4–10.2)
CHLORIDE SERPL-SCNC: 109 MMOL/L (ref 98–107)
CO2 SERPL-SCNC: 25 MMOL/L (ref 22–29)
CREAT SERPL-MCNC: 0.82 MG/DL (ref 0.55–1.02)
CREAT/UREA NIT SERPL: 21
EOSINOPHIL # BLD AUTO: 0.12 X10(3)/MCL (ref 0–0.9)
EOSINOPHIL NFR BLD AUTO: 3.2 %
ERYTHROCYTE [DISTWIDTH] IN BLOOD BY AUTOMATED COUNT: 14.3 % (ref 11.5–17)
FERRITIN SERPL-MCNC: 77.06 NG/ML (ref 4.63–204)
GFR SERPLBLD CREATININE-BSD FMLA CKD-EPI: >60 ML/MIN/1.73/M2
GLOBULIN SER-MCNC: 3.9 GM/DL (ref 2.4–3.5)
GLUCOSE SERPL-MCNC: 89 MG/DL (ref 74–100)
HCT VFR BLD AUTO: 36 % (ref 37–47)
HGB BLD-MCNC: 11.4 G/DL (ref 12–16)
IMM GRANULOCYTES # BLD AUTO: 0.01 X10(3)/MCL (ref 0–0.04)
IMM GRANULOCYTES NFR BLD AUTO: 0.3 %
IRON SATN MFR SERPL: 26 % (ref 20–50)
IRON SERPL-MCNC: 67 UG/DL (ref 50–170)
LYMPHOCYTES # BLD AUTO: 1.49 X10(3)/MCL (ref 0.6–4.6)
LYMPHOCYTES NFR BLD AUTO: 40.3 %
MCH RBC QN AUTO: 27.5 PG (ref 27–31)
MCHC RBC AUTO-ENTMCNC: 31.7 G/DL (ref 33–36)
MCV RBC AUTO: 86.7 FL (ref 80–94)
MONOCYTES # BLD AUTO: 0.33 X10(3)/MCL (ref 0.1–1.3)
MONOCYTES NFR BLD AUTO: 8.9 %
NEUTROPHILS # BLD AUTO: 1.73 X10(3)/MCL (ref 2.1–9.2)
NEUTROPHILS NFR BLD AUTO: 46.8 %
NRBC BLD AUTO-RTO: 0 %
PLATELET # BLD AUTO: 215 X10(3)/MCL (ref 130–400)
PMV BLD AUTO: 10.3 FL (ref 7.4–10.4)
POTASSIUM SERPL-SCNC: 4.1 MMOL/L (ref 3.5–5.1)
PROT SERPL-MCNC: 7.8 GM/DL (ref 6.4–8.3)
RBC # BLD AUTO: 4.15 X10(6)/MCL (ref 4.2–5.4)
SODIUM SERPL-SCNC: 139 MMOL/L (ref 136–145)
TIBC SERPL-MCNC: 194 UG/DL (ref 70–310)
TIBC SERPL-MCNC: 261 UG/DL (ref 250–450)
WBC # BLD AUTO: 3.7 X10(3)/MCL (ref 4.5–11.5)

## 2025-02-12 PROCEDURE — 1159F MED LIST DOCD IN RCRD: CPT | Mod: CPTII,S$GLB,, | Performed by: NURSE PRACTITIONER

## 2025-02-12 PROCEDURE — 99999 PR PBB SHADOW E&M-EST. PATIENT-LVL V: CPT | Mod: PBBFAC,,, | Performed by: NURSE PRACTITIONER

## 2025-02-12 PROCEDURE — 36415 COLL VENOUS BLD VENIPUNCTURE: CPT

## 2025-02-12 PROCEDURE — 3008F BODY MASS INDEX DOCD: CPT | Mod: CPTII,S$GLB,, | Performed by: NURSE PRACTITIONER

## 2025-02-12 PROCEDURE — 82728 ASSAY OF FERRITIN: CPT

## 2025-02-12 PROCEDURE — 80053 COMPREHEN METABOLIC PANEL: CPT

## 2025-02-12 PROCEDURE — 3075F SYST BP GE 130 - 139MM HG: CPT | Mod: CPTII,S$GLB,, | Performed by: NURSE PRACTITIONER

## 2025-02-12 PROCEDURE — 3079F DIAST BP 80-89 MM HG: CPT | Mod: CPTII,S$GLB,, | Performed by: NURSE PRACTITIONER

## 2025-02-12 PROCEDURE — 85025 COMPLETE CBC W/AUTO DIFF WBC: CPT

## 2025-02-12 PROCEDURE — 83550 IRON BINDING TEST: CPT

## 2025-02-12 PROCEDURE — 99213 OFFICE O/P EST LOW 20 MIN: CPT | Mod: S$GLB,,, | Performed by: NURSE PRACTITIONER

## 2025-02-12 NOTE — PROGRESS NOTES
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 She sees Dr. Schmidt and was referred to another GYN for hysterectomy who did not take her insurance. Patient has a long standing history of neutropenia dating back to her 20s. Documented Neutropenia in available labs dating back at least 7 years. Patient is actively taking oral iron 85mg of elemental iron, folate B12 and other vitamins. She has received IV iron infusions last year and the year before at [facility] area.  She does not have frequent infections.     LABS:    24 WBC 3.87, HGB 11.2, HCT 35.4, MCV 86, PLT 202K, ferritin 45, iron 75, iron sat 25%  24 WBC 3.21, hgb 10.6,ferritin 40, iron 71, sat 25  23 WBC 3.33, HGB 11.0, Iron 102, ferritin 33, sed Rate 19, CRP <1, quantitative immunoglobulins normal, free light chains normal, SPEP no M-spike, Hep and HIV nonreactive.   10/25/23 :  WBC 2.95, ANC 1.5, HGB 10.2, HCT 32.5, MCV 88, PLT 231K  10/2/23 WBC 2.88, HGB 11.14, HCT 34.8, MCV 90, [id number], iron 84, iron sat 28, folate > 20, ferritin 41, B12 771, CMP WNL except total bili 1.4retic 1.55  22 WBC 3.4, ANC   21 WBC 4.8, ANC 2.07 WBC 3.8, AC 2.0    IMAGIN23 pelvic US:  Uterus: The uterus measures 8.0 x 5.3 x 8.8 cm with multiple (at least 3) benign-appearing intramural fibroids measuring as large as 4 cm in greatest diameter.  The endometrial stripe is thickened measuring 1.5 cm in AP  thickness on transvaginal imaging.   Ovaries: The right ovary measures 3.5 x 2.3 x 2.4 cm and the left ovary measures 2.2 x 1.1 x 1.9 cm with benign-appearing follicles originating from both ovaries and 2 small (1.6 cm or less), moderately tender, benign-appearing, simple cysts noted originating from the right ovary.  11/2023 abd US:  unremarkable.   Subjective:   Interval History: Ms. Reyes is a 49 y.o. female who returns for new evaluation and treatment of. neutropenia   INTERVAL HISTORY:    02/12/2025: Patient presents to clinic for a four month follow up for iron deficiency anemia and benign ethnic neutropenia.  She reports not taking iron for at least a month.  She states she was tired of taking supplements.  Her next mammogram is due 05/2025.  She states she developed BRB on toilet paperand underwent Cologuard testing in October which was negative. She was treated for internal hemorrhoids with prednisone suppository.  She denies complaints today.  She will resume oral iron.  Ongoing leukopenia/neutropenia with WBC 3.70/neutrophils 1.73 respectively.  HGB 11.4 with normal indices.  Ferritin 77 and iron saturation 26%.    10/11/2024: Patient presents to clinic for a four month follow up for iron deficiency anemia and benign ethnic neutropenia. Continues on oral iron daily, denies any GI side effects she reports blood in stool that started last month, last week cologuard was negative with Dr. Nelson. Denies any recent blood in stool. She had a KAI BSO on 2/7/2024, denies any vaginal bleeding. Denies chest pain, shortness of breath, dizziness, heart palpitations, restless leg syndrome,PICA symptoms     5/23/24 feels like she could run a mile. Her pain from the surgery is abating. Still taking iron every day but forgets twice a week.     2/28/24:  She had KAI BSO on 2/7/24 and is feeling a lot better.     Past Medical History:   Past Medical History:   Diagnosis Date    Anemia     Benign neoplasm of nerve sheath      Cervical pain     Lumbar radiculopathy     Muscle spasticity     Muscle wasting and atrophy, not elsewhere classified, other site     Neutropenia, unspecified type     Thoracic back pain    Uterine fibroids    Past Surgical HIstory:   Past Surgical History:   Procedure Laterality Date    C6-T1 LAMINECTOMIES W/ EXCISION OF RIGHT C8 SCHWANNOMA  05/01/2020    Dr. Orlando    ENDOMETRIAL BIOPSY  2023    HYSTERECTOMY  02/07/2024    HYSTERECTOMY, TOTAL, LAPAROSCOPIC, WITH SALPINGO-OOPHORECTOMY N/A 02/07/2024    Procedure: HYSTERECTOMY,TOTAL,LAPAROSCOPIC,WITH SALPINGO-OOPHORECTOMY;  Surgeon: Leonard Hopkins MD;  Location: [facility] OR;  Service: OB/GYN;  Laterality: N/A;       Family History:   Family History   Problem Relation Name Age of Onset    Heart disease Mother      Hyperlipidemia Mother      Kidney disease Mother      Peripheral vascular disease Mother      Diabetes Mother      Stroke Father      Heart disease Father      Hyperlipidemia Father      Breast cancer Neg Hx      Colon cancer Neg Hx      Ovarian cancer Neg Hx      Uterine cancer Neg Hx         Social History:  reports that she has never smoked. She has never been exposed to tobacco smoke. She has never used smokeless tobacco. She reports that she does not currently use alcohol. She reports that she does not use drugs.    Allergies:  Review of patient's allergies indicates:  No Known Allergies    Medications:  Current Outpatient Medications   Medication Sig Dispense Refill    ascorbic acid, vitamin C, (VITAMIN C) 1000 MG tablet Take 1 tablet by mouth once daily.      cholecalciferol, vitamin D3, 125 mcg (5,000 unit) capsule Take 1 capsule by mouth once daily.      co-enzyme Q-10 30 mg capsule Take 30 mg by mouth 3 (three) times daily.      cyanocobalamin 500 MCG tablet Take 500 mcg by mouth 2 (two) times a day.      cyclobenzaprine (FLEXERIL) 10 MG tablet Take 10 mg by mouth 2 (two) times daily as needed.      CYMBALTA 30 mg capsule Take 30 mg by mouth 2 (two)  "times daily.      estradiol 0.025 mg/24 hr 0.025 mg/24 hr APPLY 1 PATCH TOPICALLY TO THE SKIN 1 TIME A WEEK      eszopiclone (LUNESTA) 3 mg Tab Take 3 mg by mouth nightly as needed.      ferrous sulfate 325 (65 FE) MG EC tablet Take 325 mg by mouth once daily.      folic acid (FOLVITE) 800 MCG Tab Take 800 mcg by mouth once daily.      gabapentin (NEURONTIN) 100 MG capsule Take 100 mg by mouth 3 (three) times daily.      meloxicam (MOBIC) 7.5 MG tablet Take 7.5 mg by mouth once daily.      VISTARIL 25 mg Cap Take 25-50 mg by mouth nightly as needed.      vitamin A 16158 UNIT capsule Take 10,000 Units by mouth once daily.      vitamin E 400 UNIT capsule Take 400 Units by mouth once daily.      zinc acetate 50 mg (zinc) Cap Take 1 capsule by mouth once daily.       No current facility-administered medications for this visit.       Review of Systems   Constitutional:  Negative for activity change, appetite change, fatigue, fever and unexpected weight change.   Eyes:  Negative for photophobia and visual disturbance.   Respiratory:  Negative for chest tightness and shortness of breath.    Cardiovascular:  Negative for chest pain and palpitations.   Gastrointestinal:  Negative for anal bleeding, blood in stool, constipation and diarrhea.   Genitourinary:  Negative for hematuria, pelvic pain and vaginal bleeding.   Allergic/Immunologic: Negative for immunocompromised state.   Neurological:  Negative for dizziness, weakness, light-headedness and headaches.   Hematological:  Negative for adenopathy. Does not bruise/bleed easily.   All other systems reviewed and are negative.      Objective:      Vitals:   Vitals:    02/12/25 1137   BP: 134/89   BP Location: Right arm   Patient Position: Sitting   Pulse: 69   Resp: 16   Temp: 98 °F (36.7 °C)   TempSrc: Oral   SpO2: 98%   Weight: 110.4 kg (243 lb 4.8 oz)   Height: 6' 0.99" (1.854 m)     BMI: Body mass index is 32.11 kg/m².    Physical Exam  Constitutional:       Appearance: " Normal appearance. She is normal weight.   HENT:      Head: Normocephalic and atraumatic.   Eyes:      Extraocular Movements: Extraocular movements intact.      Conjunctiva/sclera: Conjunctivae normal.   Cardiovascular:      Rate and Rhythm: Normal rate and regular rhythm.   Pulmonary:      Effort: Pulmonary effort is normal.      Breath sounds: Normal breath sounds.   Abdominal:      General: Abdomen is flat. Bowel sounds are normal.      Palpations: Abdomen is soft.   Musculoskeletal:         General: Normal range of motion.      Cervical back: Normal range of motion and neck supple.   Skin:     General: Skin is warm and dry.   Neurological:      General: No focal deficit present.      Mental Status: She is alert and oriented to person, place, and time.   Psychiatric:         Mood and Affect: Mood normal.         Behavior: Behavior normal.       Laboratory Data:  Lab Visit on 02/12/2025   Component Date Value Ref Range Status    Iron Binding Capacity Unsaturated 02/12/2025 194  70 - 310 ug/dL Final    Iron Level 02/12/2025 67  50 - 170 ug/dL Final    Iron Binding Capacity Total 02/12/2025 261  250 - 450 ug/dL Final    Iron Saturation 02/12/2025 26  20 - 50 % Final    Ferritin Level 02/12/2025 77.06  4.63 - 204.00 ng/mL Final    Sodium 02/12/2025 139  136 - 145 mmol/L Final    Potassium 02/12/2025 4.1  3.5 - 5.1 mmol/L Final    Chloride 02/12/2025 109 (H)  98 - 107 mmol/L Final    CO2 02/12/2025 25  22 - 29 mmol/L Final    Glucose 02/12/2025 89  74 - 100 mg/dL Final    Blood Urea Nitrogen 02/12/2025 17.0  7.0 - 18.7 mg/dL Final    Creatinine 02/12/2025 0.82  0.55 - 1.02 mg/dL Final    Calcium 02/12/2025 9.0  8.4 - 10.2 mg/dL Final    Protein Total 02/12/2025 7.8  6.4 - 8.3 gm/dL Final    Albumin 02/12/2025 3.9  3.5 - 5.0 g/dL Final    Globulin 02/12/2025 3.9 (H)  2.4 - 3.5 gm/dL Final    Albumin/Globulin Ratio 02/12/2025 1.0 (L)  1.1 - 2.0 ratio Final    Bilirubin Total 02/12/2025 0.7  <=1.5 mg/dL Final    ALP  02/12/2025 98  40 - 150 unit/L Final    ALT 02/12/2025 9  0 - 55 unit/L Final    AST 02/12/2025 15  5 - 34 unit/L Final    eGFR 02/12/2025 >60  mL/min/1.73/m2 Final    Estimated GFR calculated using the CKD-EPI creatinine (2021) equation.    Anion Gap 02/12/2025 5.0  mEq/L Final    BUN/Creatinine Ratio 02/12/2025 21   Final    WBC 02/12/2025 3.70 (L)  4.50 - 11.50 x10(3)/mcL Final    RBC 02/12/2025 4.15 (L)  4.20 - 5.40 x10(6)/mcL Final    Hgb 02/12/2025 11.4 (L)  12.0 - 16.0 g/dL Final    Hct 02/12/2025 36.0 (L)  37.0 - 47.0 % Final    MCV 02/12/2025 86.7  80.0 - 94.0 fL Final    MCH 02/12/2025 27.5  27.0 - 31.0 pg Final    MCHC 02/12/2025 31.7 (L)  33.0 - 36.0 g/dL Final    RDW 02/12/2025 14.3  11.5 - 17.0 % Final    Platelet 02/12/2025 215  130 - 400 x10(3)/mcL Final    MPV 02/12/2025 10.3  7.4 - 10.4 fL Final    Neut % 02/12/2025 46.8  % Final    Lymph % 02/12/2025 40.3  % Final    Mono % 02/12/2025 8.9  % Final    Eos % 02/12/2025 3.2  % Final    Basophil % 02/12/2025 0.5  % Final    Imm Grans % 02/12/2025 0.3  % Final    Neut # 02/12/2025 1.73 (L)  2.1 - 9.2 x10(3)/mcL Final    Lymph # 02/12/2025 1.49  0.6 - 4.6 x10(3)/mcL Final    Mono # 02/12/2025 0.33  0.1 - 1.3 x10(3)/mcL Final    Eos # 02/12/2025 0.12  0 - 0.9 x10(3)/mcL Final    Baso # 02/12/2025 0.02  <=0.2 x10(3)/mcL Final    Imm Gran # 02/12/2025 0.01  0.00 - 0.04 x10(3)/mcL Final    NRBC% 02/12/2025 0.0  % Final         Imaging:   Assessment:     1. Benign ethnic neutropenia    2. Iron deficiency anemia due to chronic blood loss            Plan:   Work up with hepatitis panel, HIV copper, ERLIN, ESR, CRP, Complete abdominal US, path review of peripheral smear and myeloma workup all normal. Already had a normal B12 and folate.   She was given  40 mg dose of prednisone and WBC demarginated and increased to 5.1   History of iron deficiency due to heavy periods secondary to fibroids. Has received IV iron in 2022 and 2021, .She has had her hysterectomy and  ferritin is up to 45 from 29 so will continue oral iron until ferritin is around 100 and then will dc.   Problem List Items Addressed This Visit          Oncology    Iron deficiency anemia, unspecified    Relevant Orders    Comprehensive Metabolic Panel    Ferritin    Iron and TIBC    CBC Auto Differential     Other Visit Diagnoses       Benign ethnic neutropenia    -  Primary        -No further work up required for neutropenia as discussed above.   -Resume oral iron and will trend her iron levels until ferritin is about 100 now that she has had a hysterectomy. Ferritin level today 77 and stable comparted to October. Increased from 52.60 on 6/16/2024. Hgb 11.4.  -RTC in 3 months with CBC and iron studies.      Gaye Cardona, MARTHA-C  Oncology/Hematology  [facility]

## 2025-03-11 ENCOUNTER — TELEPHONE (OUTPATIENT)
Dept: NEUROSURGERY | Facility: CLINIC | Age: 50
End: 2025-03-11
Payer: COMMERCIAL

## 2025-03-11 NOTE — TELEPHONE ENCOUNTER
I called patient to inform patient that she would be seeing  due to Dr. Orlando being retired and also scheduled patient's MRI C Spine on 4/9 at 7:00 am. I also reminded patient of appointment with Dr. Reynoso on 4/28 at 9:30. Patient verbalized understanding and was compliant with date and time of appointment.

## 2025-03-12 DIAGNOSIS — D36.10 BENIGN NEOPLASM OF NERVE SHEATH: Primary | ICD-10-CM

## 2025-04-09 ENCOUNTER — APPOINTMENT (OUTPATIENT)
Dept: RADIOLOGY | Facility: HOSPITAL | Age: 50
End: 2025-04-09
Attending: NEUROLOGICAL SURGERY
Payer: COMMERCIAL

## 2025-04-09 DIAGNOSIS — D36.10 BENIGN NEOPLASM OF NERVE SHEATH: ICD-10-CM

## 2025-04-09 PROCEDURE — A9577 INJ MULTIHANCE: HCPCS | Performed by: NEUROLOGICAL SURGERY

## 2025-04-09 PROCEDURE — 72156 MRI NECK SPINE W/O & W/DYE: CPT | Mod: TC

## 2025-04-09 PROCEDURE — 25500020 PHARM REV CODE 255: Performed by: NEUROLOGICAL SURGERY

## 2025-04-09 RX ADMIN — GADOBENATE DIMEGLUMINE 20 ML: 529 INJECTION, SOLUTION INTRAVENOUS at 08:04

## 2025-04-28 ENCOUNTER — OFFICE VISIT (OUTPATIENT)
Dept: NEUROSURGERY | Facility: CLINIC | Age: 50
End: 2025-04-28
Payer: COMMERCIAL

## 2025-04-28 VITALS
WEIGHT: 240 LBS | RESPIRATION RATE: 16 BRPM | SYSTOLIC BLOOD PRESSURE: 134 MMHG | HEIGHT: 72 IN | DIASTOLIC BLOOD PRESSURE: 89 MMHG | HEART RATE: 69 BPM | BODY MASS INDEX: 32.51 KG/M2

## 2025-04-28 DIAGNOSIS — D36.10 BENIGN NEOPLASM OF NERVE SHEATH: Primary | ICD-10-CM

## 2025-04-28 PROCEDURE — 1160F RVW MEDS BY RX/DR IN RCRD: CPT | Mod: CPTII,,, | Performed by: STUDENT IN AN ORGANIZED HEALTH CARE EDUCATION/TRAINING PROGRAM

## 2025-04-28 PROCEDURE — 3075F SYST BP GE 130 - 139MM HG: CPT | Mod: CPTII,,, | Performed by: STUDENT IN AN ORGANIZED HEALTH CARE EDUCATION/TRAINING PROGRAM

## 2025-04-28 PROCEDURE — 99213 OFFICE O/P EST LOW 20 MIN: CPT | Mod: ,,, | Performed by: STUDENT IN AN ORGANIZED HEALTH CARE EDUCATION/TRAINING PROGRAM

## 2025-04-28 PROCEDURE — 1159F MED LIST DOCD IN RCRD: CPT | Mod: CPTII,,, | Performed by: STUDENT IN AN ORGANIZED HEALTH CARE EDUCATION/TRAINING PROGRAM

## 2025-04-28 PROCEDURE — 3079F DIAST BP 80-89 MM HG: CPT | Mod: CPTII,,, | Performed by: STUDENT IN AN ORGANIZED HEALTH CARE EDUCATION/TRAINING PROGRAM

## 2025-04-28 PROCEDURE — 3008F BODY MASS INDEX DOCD: CPT | Mod: CPTII,,, | Performed by: STUDENT IN AN ORGANIZED HEALTH CARE EDUCATION/TRAINING PROGRAM

## 2025-04-28 RX ORDER — ESTRADIOL 0.05 MG/D
1 PATCH TRANSDERMAL
COMMUNITY
Start: 2025-04-10

## 2025-04-28 RX ORDER — CHOLECALCIFEROL (VITAMIN D3) 1250 MCG
50000 CAPSULE ORAL
COMMUNITY
Start: 2025-04-10

## 2025-04-28 RX ORDER — AMOXICILLIN AND CLAVULANATE POTASSIUM 875; 125 MG/1; MG/1
1 TABLET, FILM COATED ORAL 2 TIMES DAILY
COMMUNITY
Start: 2025-03-15 | End: 2025-04-28

## 2025-04-28 NOTE — PROGRESS NOTES
[facility]  History & Physical  Neurosurgery      Efraín Reyes   [id number]   1975     SUBJECTIVE:     CHIEF COMPLAINT:  Scheduled Follow up    HPI:  Efraín Reyes is a 49 y.o. female who presents for postoperative follow up.   She was last seen in the office on May 24, 2023.    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  She continues with heaviness across the tops of the shoulders and intermittent cramping in the right hand and last two fingers. These symptoms have been present since surgery. They are not worsening, but have persisted.   Surveillance MRI was recently completed.  She is here today to review results.  No issues with fine motor skills or balance.    Past Medical History:   Diagnosis Date    Anemia     Benign neoplasm of nerve sheath     Cervical pain     Lumbar radiculopathy     Muscle spasticity     Muscle wasting and atrophy, not elsewhere classified, other site     Neutropenia, unspecified type     Thoracic back pain        Past Surgical History:   Procedure Laterality Date    C6-T1 LAMINECTOMIES W/ EXCISION OF RIGHT C8 SCHWANNOMA  05/01/2020    Dr. Orlando    ENDOMETRIAL BIOPSY  2023    HYSTERECTOMY  02/07/2024    HYSTERECTOMY, TOTAL, LAPAROSCOPIC, WITH SALPINGO-OOPHORECTOMY N/A 02/07/2024    Procedure: HYSTERECTOMY,TOTAL,LAPAROSCOPIC,WITH SALPINGO-OOPHORECTOMY;  Surgeon: Leonard Hopkins MD;  Location: [facility];  Service: OB/GYN;  Laterality: N/A;       Family History   Problem Relation Name Age of Onset    Heart disease Mother      Hyperlipidemia Mother      Kidney disease Mother      Peripheral vascular disease Mother      Diabetes Mother      Stroke Father      Heart disease Father      Hyperlipidemia Father      Breast cancer Neg Hx      Colon cancer Neg Hx      Ovarian cancer Neg Hx      Uterine cancer Neg Hx         Social History     Socioeconomic History     Marital status:    Tobacco Use    Smoking status: Never     Passive exposure: Never    Smokeless tobacco: Never   Substance and Sexual Activity    Alcohol use: Not Currently     Comment: occasional    Drug use: Never    Sexual activity: Yes     Partners: Male     Birth control/protection: See Surgical Hx     Comment: STI: Trich       Review of patient's allergies indicates:  No Known Allergies     Current Outpatient Medications   Medication Instructions    ascorbic acid, vitamin C, (VITAMIN C) 1000 MG tablet 1 tablet, Daily    cholecalciferol, vitamin D3, 125 mcg (5,000 unit) capsule 1 capsule, Daily    co-enzyme Q-10 30 mg, 3 times daily    cyanocobalamin 500 mcg, 2 times daily    cyclobenzaprine (FLEXERIL) 10 mg, 2 times daily PRN    DECARA 50,000 Units, Every 7 days    estradiol 0.05 mg/24 hr td ptwk 0.05 mg/24 hr PTWK 1 patch, Every 7 days    eszopiclone (LUNESTA) 3 mg, Nightly PRN    ferrous sulfate 325 mg, Daily    folic acid (FOLVITE) 800 mcg, Daily    vitamin A 10,000 Units, Daily    vitamin E 400 Units, Daily    zinc acetate 50 mg (zinc) Cap 1 capsule, Daily        Review of Systems  12 point review of systems conducted, negative except as stated in the history of present illness. See HPI for details.      OBJECTIVE:       Visit Vitals  /89 (BP Location: Right arm, Patient Position: Sitting)   Pulse 69   Resp 16   Ht 6' (1.829 m)   Wt 108.9 kg (240 lb)   LMP 01/19/2024   BMI 32.55 kg/m²        General:  Pleasant, Well-nourished, Well-groomed.    Lungs:  Breathing is quiet with non-labored respirations.    Musculoskeletal:  healing well, no significant drainage, no dehiscence, no significant erythema    Neurological:  oriented to person, place, time, and general circumstances  Normal  Good strength bilateral upper extremities.    Numbness in medial aspect of right hand and forearm.  Negative Oconnor's.    Imaging:  All pertinent neuroimaging independently reviewed. Discussed these findings in  detail with the patient.    MRI cervical spine with and without contrast was completed on April 9, 2025.    It demonstrates prior resection of right C8 nerve sheath neoplasm with no evidence of recurrence.  No high-grade stenosis or cord signal change.    Diagnosis:       ICD-10-CM ICD-9-CM   1. Benign neoplasm of nerve sheath  D36.10 215.9     ASSESSMENT/PLAN:     Recommend continuing imaging surveillance.  We will repeat MRI cervical spine with and without contrast in 2 years.    She knows to call the office anytime in the meantime with questions or concerns.    She will continue on symptom management with pain management as well as primary care.    Follow up in about 2 years (around 4/28/2027) for with MD, Follow-up, To review imaging.    Get Reynoso MD  Neurosurgery  [facility]    20 minutes were personally spent on this visit including my review of available records, prior imaging, comprehensive physical and neurologic examination and discussion with the patient.     Disclaimer:  This note is prepared using voice recognition software and as such is likely to have errors despite attempts at proofreading.

## 2025-06-04 ENCOUNTER — OFFICE VISIT (OUTPATIENT)
Facility: CLINIC | Age: 50
End: 2025-06-04
Payer: COMMERCIAL

## 2025-06-04 ENCOUNTER — LAB VISIT (OUTPATIENT)
Dept: LAB | Facility: HOSPITAL | Age: 50
End: 2025-06-04
Payer: COMMERCIAL

## 2025-06-04 VITALS
SYSTOLIC BLOOD PRESSURE: 121 MMHG | HEIGHT: 72 IN | HEART RATE: 65 BPM | OXYGEN SATURATION: 99 % | TEMPERATURE: 98 F | RESPIRATION RATE: 16 BRPM | BODY MASS INDEX: 32.15 KG/M2 | WEIGHT: 237.38 LBS | DIASTOLIC BLOOD PRESSURE: 80 MMHG

## 2025-06-04 DIAGNOSIS — D72.819 LEUKOPENIA, UNSPECIFIED TYPE: ICD-10-CM

## 2025-06-04 DIAGNOSIS — D64.9 NORMOCYTIC ANEMIA: ICD-10-CM

## 2025-06-04 DIAGNOSIS — D50.0 IRON DEFICIENCY ANEMIA DUE TO CHRONIC BLOOD LOSS: ICD-10-CM

## 2025-06-04 DIAGNOSIS — D70.9 NEUTROPENIA, UNSPECIFIED TYPE: ICD-10-CM

## 2025-06-04 DIAGNOSIS — Z67.A1 BENIGN ETHNIC NEUTROPENIA: Primary | ICD-10-CM

## 2025-06-04 DIAGNOSIS — N92.0 MENORRHAGIA WITH REGULAR CYCLE: ICD-10-CM

## 2025-06-04 LAB
ALBUMIN SERPL-MCNC: 3.8 G/DL (ref 3.5–5)
ALBUMIN/GLOB SERPL: 1 RATIO (ref 1.1–2)
ALP SERPL-CCNC: 91 UNIT/L (ref 40–150)
ALT SERPL-CCNC: 10 UNIT/L (ref 0–55)
ANION GAP SERPL CALC-SCNC: 7 MEQ/L
AST SERPL-CCNC: 15 UNIT/L (ref 11–45)
BASOPHILS # BLD AUTO: 0.02 X10(3)/MCL
BASOPHILS NFR BLD AUTO: 0.6 %
BILIRUB SERPL-MCNC: 0.8 MG/DL
BUN SERPL-MCNC: 15 MG/DL (ref 7–18.7)
CALCIUM SERPL-MCNC: 9.1 MG/DL (ref 8.4–10.2)
CHLORIDE SERPL-SCNC: 108 MMOL/L (ref 98–107)
CO2 SERPL-SCNC: 26 MMOL/L (ref 22–29)
CREAT SERPL-MCNC: 0.87 MG/DL (ref 0.55–1.02)
CREAT/UREA NIT SERPL: 17
EOSINOPHIL # BLD AUTO: 0.11 X10(3)/MCL (ref 0–0.9)
EOSINOPHIL NFR BLD AUTO: 3.2 %
ERYTHROCYTE [DISTWIDTH] IN BLOOD BY AUTOMATED COUNT: 13.9 % (ref 11.5–17)
FERRITIN SERPL-MCNC: 96.76 NG/ML (ref 4.63–204)
GFR SERPLBLD CREATININE-BSD FMLA CKD-EPI: >60 ML/MIN/1.73/M2
GLOBULIN SER-MCNC: 3.9 GM/DL (ref 2.4–3.5)
GLUCOSE SERPL-MCNC: 92 MG/DL (ref 74–100)
HCT VFR BLD AUTO: 35.9 % (ref 37–47)
HGB BLD-MCNC: 11.2 G/DL (ref 12–16)
IMM GRANULOCYTES # BLD AUTO: 0.01 X10(3)/MCL (ref 0–0.04)
IMM GRANULOCYTES NFR BLD AUTO: 0.3 %
IRON SATN MFR SERPL: 22 % (ref 20–50)
IRON SERPL-MCNC: 52 UG/DL (ref 50–170)
LYMPHOCYTES # BLD AUTO: 1.31 X10(3)/MCL (ref 0.6–4.6)
LYMPHOCYTES NFR BLD AUTO: 38.1 %
MCH RBC QN AUTO: 27.3 PG (ref 27–31)
MCHC RBC AUTO-ENTMCNC: 31.2 G/DL (ref 33–36)
MCV RBC AUTO: 87.6 FL (ref 80–94)
MONOCYTES # BLD AUTO: 0.27 X10(3)/MCL (ref 0.1–1.3)
MONOCYTES NFR BLD AUTO: 7.8 %
NEUTROPHILS # BLD AUTO: 1.72 X10(3)/MCL (ref 2.1–9.2)
NEUTROPHILS NFR BLD AUTO: 50 %
NRBC BLD AUTO-RTO: 0 %
PLATELET # BLD AUTO: 221 X10(3)/MCL (ref 130–400)
PMV BLD AUTO: 10.9 FL (ref 7.4–10.4)
POTASSIUM SERPL-SCNC: 4.2 MMOL/L (ref 3.5–5.1)
PROT SERPL-MCNC: 7.7 GM/DL (ref 6.4–8.3)
RBC # BLD AUTO: 4.1 X10(6)/MCL (ref 4.2–5.4)
SODIUM SERPL-SCNC: 141 MMOL/L (ref 136–145)
TIBC SERPL-MCNC: 189 UG/DL (ref 70–310)
TIBC SERPL-MCNC: 241 UG/DL (ref 250–450)
WBC # BLD AUTO: 3.44 X10(3)/MCL (ref 4.5–11.5)

## 2025-06-04 PROCEDURE — 82728 ASSAY OF FERRITIN: CPT

## 2025-06-04 PROCEDURE — 85025 COMPLETE CBC W/AUTO DIFF WBC: CPT

## 2025-06-04 PROCEDURE — 80053 COMPREHEN METABOLIC PANEL: CPT

## 2025-06-04 PROCEDURE — 83550 IRON BINDING TEST: CPT

## 2025-06-04 PROCEDURE — 99999 PR PBB SHADOW E&M-EST. PATIENT-LVL IV: CPT | Mod: PBBFAC,,,

## 2025-06-04 PROCEDURE — 36415 COLL VENOUS BLD VENIPUNCTURE: CPT

## 2025-06-06 NOTE — PROGRESS NOTES
Chief Complaint:  Well Woman     History of Present Illness:  Efraín Reyes is a 49 y.o. year old  presents for her well woman exam status post H BSO secondary to menorrhagia and uterine fibroids. No vaginal bleeding.     Cancer-related family history is negative for Breast cancer, Ovarian cancer, and Uterine cancer.        Gyn History:  Menstrual History  Cycle: No  Menarche Age: 13 years  No Cycle Reason: (!) Surgical    Menopause  Menopause Age: 48 years  Post Menopausal Bleeding: No  Hormone Replacement Therapy: Yes (Estradiol patches)    Pap History  Last pap date: 23 (NIL -HPV -GC/CZ/TV)  Result: Normal  History of Abnormal Pap: (!) Yes ((in early 's per patient))  HPV Vaccine Completed: No (0/3)    [place]  Sexually Active: Yes  Sexual Orientation: heterosexual  Postcoital Bleeding: No  Dyspareunia: No  STI History: Yes  STI Type: Trichomonas  Contraception: No    Breast History  Last Breast Imaging Date: Yes  Date: 24 (Benign)  History of Abnormal Breast Imaging : (!) Yes ( H/o Dense breast per patient)  History of Breast Biopsy: No        Review of Systems:  General/Constitutional: Chills denies. Fatigue/weakness denies. Fever denies. Night sweats denies. Hot flashes denies    Respiratory: Cough denies. Hemoptysis denies. SOB denies. Sputum production denies. Wheezing denies .   Cardiovascular: Chest pain denies. Dizziness denies. Palpitations denies. Swelling in hands/feet denies.                Breast: Dimpling denies. Breast mass denies. Breast pain/tenderness denies. Nipple discharge denies. Puckering denies.  Gastrointestinal: Abdominal pain denies. Blood in stool denies. Constipation denies. Diarrhea denies. Heartburn denies. Nausea denies. Vomiting denies    Genitourinary: Incontinence denies. Blood in urine denies. Frequent urination denies. Painful urination denies. Urinary urgency denies. Nocturia denies    Gynecologic: Irregular menses denies. Heavy bleeding  "denies. Painful menses denies. Vaginal discharge denies. Vaginal odor denies. Vaginal itching denies. Vaginal lesion denies. Pelvic pain denies. Decreased libido denies. Vulvar lesion denies. Prolapse of genital organs denies. Painful intercourse denies. Postcoital bleeding denies    Psychiatric: Depression denies. Anxiety denies.     OB History    Para Term  AB Living   1 1 1 0 0 1   SAB IAB Ectopic Multiple Live Births   0 0 0 0 1      # 1 - Date: [birth year], Sex: Female, Weight: 3.515 kg (7 lb 12 oz), GA: None, Type: Vaginal, Spontaneous, Apgar1: None, Apgar5: None, Living: Living, Birth Comments: None      Past Medical History:   Diagnosis Date    Anemia     Benign neoplasm of nerve sheath     Cervical pain     Lumbar radiculopathy     Muscle spasticity     Muscle wasting and atrophy, not elsewhere classified, other site     Neutropenia, unspecified type     Thoracic back pain      Current Medications[1]    Review of patient's allergies indicates:  No Known Allergies    Past Surgical History:   Procedure Laterality Date    C6-T1 LAMINECTOMIES W/ EXCISION OF RIGHT C8 SCHWANNOMA  2020    Dr. Orlando    ENDOMETRIAL BIOPSY      HYSTERECTOMY  2024    HYSTERECTOMY, TOTAL, LAPAROSCOPIC, WITH SALPINGO-OOPHORECTOMY N/A 2024    Procedure: HYSTERECTOMY,TOTAL,LAPAROSCOPIC,WITH SALPINGO-OOPHORECTOMY;  Surgeon: Leonard Hopkins MD;  Location: [facility];  Service: OB/GYN;  Laterality: N/A;     Family History   Problem Relation Name Age of Onset    Heart disease Mother      Hyperlipidemia Mother      Kidney disease Mother      Peripheral vascular disease Mother      Diabetes Mother      Stroke Father      Heart disease Father      Hyperlipidemia Father      Breast cancer Neg Hx      Colon cancer Neg Hx      Ovarian cancer Neg Hx      Uterine cancer Neg Hx       Social History[2]    Physical Exam:  /78   Ht 6' 0.01" (1.829 m)   Wt 108 kg (238 lb)   LMP 2024   BMI 32.27 kg/m² "     Chaperone: present.       General appearance: healthy, well-nourished and well-developed     Psychiatric: Orientation to time, place and person. Normal mood and affect and active, alert     Skin: Appearance: no rashes or lesions.     Neck:   Neck: supple, FROM, trachea midline. and no masses   Thyroid: no enlargement or nodules and non-tender.       Cardiovascular:   Auscultation: RRR and no murmur.   Peripheral Vascular: no varicosities, LLE edema, RLE edema, calf tenderness, and palpable cord and pedal pulses intact.     Lungs:   Respiratory effort: no intercostal retractions or accessory muscle usage.   Auscultation: no wheezing, rales/crackles, or rhonchi and clear to auscultation.     Breast:   Inspection/Palpation: no tenderness, discrete/distinct masses, skin changes, or abnormal secretions. Nipple appearance normal.     Abdomen:   Auscultation/Inspection/Palpation: no hepatomegaly, splenomegaly, masses, tenderness or CVA tenderness and soft, non-distended bowel sounds preset.    Hernia: no palpable hernias.     Female Genitalia:    Vulva: no masses, tenderness or lesions    Bladder/Urethra: no urethral discharge or mass, normal meatus, bladder non-distended.    Vagina: no tenderness, erythema, cystocele, rectocele, abnormal vaginal discharge or vesicle(s) or ulcers    Cuff intact, no masses, NT   Adnexa/Parametria: no parametrial tenderness or mass, no adnexal tenderness or ovarian mass.     Lymph Nodes:   Palpation: non tender submandibular nodes, axillary nodes, or inguinal nodes.     Rectal Exam:   Rectum: normal perianal skin.       Assessment/Plan:  1. Well woman exam  No pap, s/p hyst, + h/o abnl pap, early 20s  Recommend BSE monthly  Discussed recommendations of annual screening after age of 40 with mammogram  Explained that screening is not 100% reliable. Advised patient if she notices any changes to her breast including a lump, mass, dimpling, discharge, rash, or tenderness she should contact us  immediately.     Healthy diet, exercise   MMG  Multivitamin   Seat belt   Sunscreen use   Safe sex/ STI education   Annual labs: w. PCP    2. Hot flash, menopausal  Educated  Content with Estradiol patch 0.05 mg  Desires to continue    Discussed symptoms of decreased hormone levels and that these symptoms usually last 6 months to 2 years.       Reviewed hormone replacement options as well as indications and contraindications.       Discussed the WHI study findings and current FDA recommendations. Also discussed current recommendations for breast screening.     Reviewed precautions when taking female hormones and symptoms to be aware of such as headache, visual change, focal weakness or numbness, SOB,chest pain, pain in thigh or calf, breast pain or lump, and vaginal bleeding. Instructed to call immediately and stop HRT if any of these symptoms occur.       Discussed sexuality.     Answered questions.    3. Screening mammogram for breast cancer  -     Mammo Digital Screening Bilat w/ Marcel (XPD); Future; Expected date: 06/10/2025             This note was transcribed by Nell Cormier MA. There may be transcription errors as a result, however minimal. I agree with transcription and every effort has been made to ensure accuracy of transcription, but any obvious errors or omissions should be clarified with the author of the document.             [1]   Current Outpatient Medications:     ascorbic acid, vitamin C, (VITAMIN C) 1000 MG tablet, Take 1 tablet by mouth once daily., Disp: , Rfl:     cholecalciferol, vitamin D3, 125 mcg (5,000 unit) capsule, Take 1 capsule by mouth once daily., Disp: , Rfl:     co-enzyme Q-10 30 mg capsule, Take 30 mg by mouth 3 (three) times daily., Disp: , Rfl:     cyanocobalamin 500 MCG tablet, Take 500 mcg by mouth 2 (two) times a day., Disp: , Rfl:     cyclobenzaprine (FLEXERIL) 10 MG tablet, Take 10 mg by mouth 2 (two) times daily as needed., Disp: , Rfl:     DECARA 1,250 mcg (50,000 unit)  capsule, Take 50,000 Units by mouth every 7 days., Disp: , Rfl:     eszopiclone (LUNESTA) 3 mg Tab, Take 3 mg by mouth nightly as needed., Disp: , Rfl:     ferrous sulfate 325 (65 FE) MG EC tablet, Take 325 mg by mouth once daily., Disp: , Rfl:     folic acid (FOLVITE) 800 MCG Tab, Take 800 mcg by mouth once daily., Disp: , Rfl:     vitamin A 78815 UNIT capsule, Take 10,000 Units by mouth once daily., Disp: , Rfl:     vitamin E 400 UNIT capsule, Take 400 Units by mouth once daily., Disp: , Rfl:     zinc acetate 50 mg (zinc) Cap, Take 1 capsule by mouth once daily., Disp: , Rfl:     estradiol 0.05 mg/24 hr td ptwk 0.05 mg/24 hr PTWK, Place 1 patch onto the skin every 7 days., Disp: 4 patch, Rfl: 11  [2]   Social History  Socioeconomic History    Marital status:    Tobacco Use    Smoking status: Never     Passive exposure: Never    Smokeless tobacco: Never   Substance and Sexual Activity    Alcohol use: Not Currently     Comment: occasional    Drug use: Never    Sexual activity: Yes     Partners: Male     Birth control/protection: See Surgical Hx     Comment: STI: Trich

## 2025-06-10 ENCOUNTER — OFFICE VISIT (OUTPATIENT)
Dept: OBSTETRICS AND GYNECOLOGY | Facility: CLINIC | Age: 50
End: 2025-06-10
Payer: COMMERCIAL

## 2025-06-10 VITALS
WEIGHT: 238 LBS | DIASTOLIC BLOOD PRESSURE: 78 MMHG | HEIGHT: 72 IN | BODY MASS INDEX: 32.23 KG/M2 | SYSTOLIC BLOOD PRESSURE: 124 MMHG

## 2025-06-10 DIAGNOSIS — Z01.419 WELL WOMAN EXAM: Primary | ICD-10-CM

## 2025-06-10 DIAGNOSIS — Z12.31 SCREENING MAMMOGRAM FOR BREAST CANCER: ICD-10-CM

## 2025-06-10 DIAGNOSIS — N95.1 HOT FLASH, MENOPAUSAL: ICD-10-CM

## 2025-06-10 PROCEDURE — 3078F DIAST BP <80 MM HG: CPT | Mod: CPTII,,, | Performed by: OBSTETRICS & GYNECOLOGY

## 2025-06-10 PROCEDURE — 3074F SYST BP LT 130 MM HG: CPT | Mod: CPTII,,, | Performed by: OBSTETRICS & GYNECOLOGY

## 2025-06-10 PROCEDURE — 99396 PREV VISIT EST AGE 40-64: CPT | Mod: ,,, | Performed by: OBSTETRICS & GYNECOLOGY

## 2025-06-10 PROCEDURE — 3008F BODY MASS INDEX DOCD: CPT | Mod: CPTII,,, | Performed by: OBSTETRICS & GYNECOLOGY

## 2025-06-10 PROCEDURE — 1159F MED LIST DOCD IN RCRD: CPT | Mod: CPTII,,, | Performed by: OBSTETRICS & GYNECOLOGY

## 2025-06-10 RX ORDER — ESTRADIOL 0.05 MG/D
1 PATCH TRANSDERMAL
Qty: 4 PATCH | Refills: 11 | Status: SHIPPED | OUTPATIENT
Start: 2025-06-10

## 2025-06-13 LAB
HIGH RISK HPV: NEGATIVE
PSYCHE PATHOLOGY RESULT: NORMAL

## (undated) DEVICE — APPLIER CLIP EPIX UNIV 5X34

## (undated) DEVICE — ADHESIVE DERMABOND ADVANCED

## (undated) DEVICE — SUT VICRYL+ 27 UR-6 VIOL

## (undated) DEVICE — CHLORAPREP W TINT 26ML APPL

## (undated) DEVICE — KIT PINK PAD ONE STEP W/ HDRST

## (undated) DEVICE — SOL POVIDONE SCRUB IODINE 4 OZ

## (undated) DEVICE — SUT MONOCYRL 4-0 PS2 UND

## (undated) DEVICE — BLADE SURG CARBON STEEL SZ11

## (undated) DEVICE — FILTER LAPAROSCOPIC SMOKE EVAC

## (undated) DEVICE — CATH FOLEY 16F COUNCIL STA LOC

## (undated) DEVICE — SET CYSTO IRR DRP CHMBR 84IN

## (undated) DEVICE — TRAY DRY SKIN SCRUB PREP

## (undated) DEVICE — GOWN POLY REINF BRTH SLV 2XL

## (undated) DEVICE — NDL INSUFFLATION VERRES 120MM

## (undated) DEVICE — DRAPE LEGGINGS CUFF 33X51IN

## (undated) DEVICE — DISSECTOR 5MM ENDOPATH

## (undated) DEVICE — MANIPULATOR UTERINE 3CM

## (undated) DEVICE — TROCAR ENDO Z THREAD KII 5X100

## (undated) DEVICE — SCISSOR 5MMX35CM DIRECT DRIVE

## (undated) DEVICE — SOL POVIDONE PREP IODINE 4 OZ

## (undated) DEVICE — GLOVE PROTEXIS PI SYN SURG 8.0

## (undated) DEVICE — DEVICE ENSEAL X1 CRV JAW 37CM

## (undated) DEVICE — SYS HYDRO-SURG IRR SMOKE EVAC

## (undated) DEVICE — COVER PROXIMA MAYO STAND

## (undated) DEVICE — DRAPE UNDERBUTTOCKS PCH STRL

## (undated) DEVICE — SUT V-LOC GRN 30CM 12IN 2-0

## (undated) DEVICE — Device

## (undated) DEVICE — DISSECTOR EPIX LAPA 5MMX35CM

## (undated) DEVICE — SLEEVE KII ADV FIX 5X100MM